# Patient Record
Sex: FEMALE | Race: WHITE
[De-identification: names, ages, dates, MRNs, and addresses within clinical notes are randomized per-mention and may not be internally consistent; named-entity substitution may affect disease eponyms.]

---

## 2017-12-07 NOTE — NUR
RECEIVED REPORT FROM ER NURSE DEVENDRA, ACCOMPANIED BY RN AND EMT VIA KENAN. ATTACHED TO O2 
@ 3LPM VIA NASAL CANNULA, PT IS DROWSY. IV AT LEFT HAND 18G , INFUSING WELL. ON INSULIN 
DRIP. BED IN LOW POSITION, SAFETY MEASURE ENSURE. REDNESS NOTED AT PERINEAL AND GILMA RECTAL 
AREA. WILL CONTINUE TO MONITOR.

## 2017-12-07 NOTE — NUR
APatient will be admitted to care of PHAN. Admited to ICU.  Will go to BED 
5. Belongings list completed. BEDSIDE Report to CLARIZE RN. INSULIN DRIP 
INFUSING AT 5UNITS/HR PER PROTOCOL UPON TRANSFER, PT STABLE TO TRANSFER TO ICU. 
TRASPORTED VIA GURNEY WITH CARDIAC MONITOR AND PULSE OX

## 2017-12-07 NOTE — NUR
PT STILL NOTED WITH KUSSMAULS RESPIRATIONS, ON 4LPMNC SATS 100%, 96HR. ER MD CRAIG MADE AWARE. PT MADE COMFORTBLE, STILL WOULD NOT VERBALLY RESPOND TO 
QUESTIONS. NO S/SX OF PAIN AT THIS TIME. INSULIN DRIP STARTED,SEE MAR

-------------------------------------------------------------------------------

Addendum: 12/07/17 at 2132 by OPHELIA

-------------------------------------------------------------------------------

PER MD CRAIG, ACCUCHECK EVERY HOUR AFTER STARTING INSULIN DRIP

## 2017-12-07 NOTE — NUR
2300 PT HAVING SHORTNESS OF BREATH AND PLACED PT ON BIPAP 12/6 RR 14 30%. LOWERED BIPAP TO 
IPAP 8 EPAP 4 RR 12 DUE TO HIGH TIDAL VOLUMES. PT SEEMS LESS SOB.

## 2017-12-07 NOTE — NUR
DR. LOPEZ AT BEDSIDE WITH DR. OTT. [facility] AT BEDSIDE. PT FOR CENTRAL LINE INSERTION. 
CONSENT SIGNED.

## 2017-12-08 NOTE — NUR
RECEIVED REPORT FROM AM SHIFT. PT IS SLEEPING EASY TO AWAKE. PT IS ALERT ORIENTED X3 WHEN 
AWAKE.ON ROOM AIR,NO S/S OF RESP.DISTRESS,NO SOB. HOB UP 30-45 DEGREE. SPO2 100%. SKIN WATM 
TO TOUCH. DENIES ANY PAIN AT THIS TIME.NELI LUNGS SOUND CLEAR. IV TOLEFT HANDS NO 18GAUGE AND 
RFA NO 22. MAGNESIUM AND POTASSIUM STILL RUNNING AS ORDER. CONT ON INSULIN DRIPS AS PROTOCOL 
AT 2 UNITS/HR. CONT ON IVF D5 IN 1/2 NS  CC/HR AND NS  CC/HR TOLERATING WELL. 
CONT ON IV ABT AS ORDER.NPO EXCEPT MEDS. ABD FLAT,SOFT NON DISTENDED.ACTIVE BOWEL SOUNDS TO 
ALL QUADRANTS.REDNESS TO PERINEAL AND PERIANAL AREA. KEPT AREA CLEAN AND DRY. GOOD PERIANAL 
AND PERINEAL CARE GIVEN. F/C IN PLACE WITH YELLOW CLEAR COLOR NOTED.GENTLE CARE GIVEN. KEPT 
PT CLEAN AND DRY. CALL LIGHT IN REACH. BED IN LOW POSITIONS.

## 2017-12-08 NOTE — NUR
RECEIVED CT HEAD RESULT VIA FAX, NO ACUTE INTRACRANIAL HEMORRHAGE/ABNORMALITY EVIDENT. DR. SWARTZ (RESIDENT) HERE IN THE UNIT  AND AWARE OF THE RESULT.

## 2017-12-08 NOTE — NUR
12/8/17 RD INITIAL ASSESSMENT COMPLETED

PLEASE REFER TO NUTRITION ASSESSMENT UNDER CARE ACTIVITY FOR ESTIMATED NUTRITIONAL NEEDS. 



1. CONTINUE NPO AS MEDICALLY NECESSARY PER MD

2. WHEN MEDICALLY APPROPRIATE ADVANCE DIET TO [facility] 60 GM DIET 

3. RD PROVIDED PT WITH DM DIET EDUCATION

4. RD TO FOLLOW-UP 3-5 DAYS, MODERATE RISK 



DUDLEY SPRINGER RD

## 2017-12-08 NOTE — NUR
BLOOD SUGAR 191 DECREASE INSULIN TO 1 UNIT/HR PER PROTOCOL.PT REQUESTED TO HAVE BED BATH. 
BED BATH WAS GIVEN, PT ABLE TO HELP CLEAN HER SELF. KEPT CLEAN AND DRY. CALL LIGHT IN REACH.

## 2017-12-08 NOTE — NUR
DR. BARRAZA AND RESIDENT GROUP IN TO SEE PT. WILL FOLLOW UP ON ORDERS.

-------------------------------------------------------------------------------

Addendum: 12/08/17 at 1500 by Conner Haines RN

-------------------------------------------------------------------------------

DR. NORMAN

## 2017-12-08 NOTE — NUR
PATIENT HAS BEEN SCREENED AND CATEGORIZED AS HIGH NUTRITION RISK. PATIENT WILL BE SEEN 
WITHIN 1-2 DAYS OF ADMISSION.



12/08/17-12/09/17



DUDLEY SPRINGER RD

## 2017-12-08 NOTE — NUR
PT IS MORE ALERT AND AWAKE AT THIS TIME. DR. LOPEZ AT BEDSIDE WITH THE PATIENT. NO LONGER 
ON BIPAP. O2 @ 2LPM VIA NASAL CANNULA. WILL CONTINUE TO MONITOR.

## 2017-12-08 NOTE — NUR
RECHECKED TEMP. STILL 100.4. COOLING MEASURES IN PLACE. NO S/SX OF ACUTE DISTRESS. WILL 
CONTINUE TO MONITOR.

## 2017-12-08 NOTE — NUR
SPOKE WITH JANET FROM [facility].    SHE SAID THE REVIEW JUST GO TO HER.   FAXED INITIAL REVIEW 
TO [facility] [phone]   PHONE [phone]

## 2017-12-08 NOTE — NUR
PT IS SLEEPING COMFORTABLY. TEMP 100.5. COOLING MEASURES IN PLACE. NO S/SX OF DISTRESS OT 
DISCOMFORT NOTED. WILL CONTINUE TO MONITOR.

## 2017-12-08 NOTE — NUR
DR. GRIGGS MADE AWARE OF PT'S LOW BP. PT IS ASLEEP. NO SIGNS OF ACUTE DISTRESS. WILL 
FOLLOW UP ON ORDERS AND CONTINUE TO MONITOR.

## 2017-12-08 NOTE — NUR
REPORT GIVEN TO NIGHT NURSE FOR CONTINUITY OF CARE. PT IS ASLEEP. NO SOB OR S/SX OF ACUTE 
DISTRESS.

## 2017-12-08 NOTE — NUR
REPORT RECEIVED FROM NIGHT NURSE. PT IS AWAKE AND ALERT, ORIENTED X 4. SINUS TACHY ON 
MONITOR. PT IS ON ROOM AIR, O2 SAT 98%, BILATERAL LUNGS CLEAR. NO SOB NOTED. ABDOMEN SOFT, 
NONDISTENDED, NONTENDER. BOWEL SOUNDS  PRESENT. PERIPHERAL IV G18 TO LEFT WRIST PATENT AND 
INTACT, INFUSING ORDERED IV FLUID AND INSULIN DRIP AT 2 UNIT/HR. CAMPOS CATH IN PLACE 
DRAINING URINE TO GRAVITY DRAINAGE BAG. REDNESS NOTED IN PERINEAL AND GILMA RECTAL AREA. BED 
IN LOWEST POSITION AND CALL LIGHT WITHIN REACH. NEEDS WELL ATTENDED. WILL CONTINUE TO 
MONITOR.

## 2017-12-09 NOTE — NUR
PATIENT RECEIVED FROM ICU. PATIENT AWAKE, ALERT AND ORIENTED. NO S/S OF DISTRESS NOTED. 
PATIENT ON ROOM AIR NO SOB. CAMPOS CATHETER IN PLACE, DRAINING CLEAR YELLOW URINE. PATIENT ON 
TELE MONITORING. BED LOWERED WITH CALL LIGHT WITHIN REACH. WILL CONTINUE TO MONITOR. TEMP 
99.6 BP: 95/55 HR: 108 O2 SAT: 100% ON ROOM AIR

## 2017-12-09 NOTE — NUR
DR PRADO COME TO SEE PT. PLANNING TO TRANSFER PT TO MST FLOOR TODAY. WILL ENDORESED TO AM 
SHIFT TO FOLLOW UP.

## 2017-12-09 NOTE — NUR
PER  TO D/C DEX 5 IN 1/2 NS,AND CONTINUE DRIPS UNTIL 2 MORE HOURS, PT START EATING 
TODAY.HUMALOG TO BE GIVEN AT 7 UNITS AT 7:30 AM.PT MADE AWARE.

## 2017-12-09 NOTE — NUR
REPORT RECEIVED FROM NIGHT NURSE. PT IS AWAKE, AAOX4. ABLE TO MAKE NEEDS KNOWN. ST ON 
MONITOR. ON ROOM AIR, BILATERAL LUNGS CLEAR. PERIPHERAL IVS TO RIGHT FOREARM AND LEFT HAND 
PATENT AND INTACT. ABDOMEN SOFT, NONTENDER, NON DISTENDED. CAMPOS CATH IN PLACE DRAINING 
URINE TO GRAVITY DRAINAGE BAG. SCDS IN PLACE FOR VTE PROPHYLAXIS. HOB 30 DEGREES, BED IN LOW 
POSITION, CALL LIGHT WITHIN REACH. NEEDS WELL ATTENDED. WILL CONTINUE TO MONITOR.

## 2017-12-10 NOTE — NUR
PATIENT AWAKE, ALERT. RESPIRATION EVEN, UNLABOR. NO DISTRESS NOTED AT THIS TIME. DENIED 
PAIN. REQUESTED SHOWER. WILL NOTIFY DR. POSEY. CALL LIGHT WITHIN REACH. WILL CONTINUE TO 
MONITOR

## 2017-12-10 NOTE — NUR
PT HAS BEEN MONITORIN CLOSE ASSISTING ON HER ADLS NOT DISTRESS NOTED ON TELEMETRY SR  PT IS 
AMBULATORY

## 2017-12-10 NOTE — NUR
DISCHARGE INSTRUCTION WAS GIVEN. PATIENT VERBALIZED UNDERSTANDING. IVS WERE REMOVED WITH 
CATHETER TIP INTACT. PATIENT TOLERATED WELL. ID BAND WAS REMOVED. FAMILY AT BEDSIDE. CARDIAC 
MONITOR WAS REMOVED. PATIENT WAS ESCORTED OUT BY FAMILY AND STAFF.

## 2017-12-10 NOTE — NUR
PATIENT IS AWAKE, ALERT. RESPIRATION EVEN, UNLABOR. DENIED PAIN AT THIS TIME. FAMILY AT 
BEDSIDE. CALL LIGHT WITHIN REACH. WILL CONTINUE TO MONITOR

## 2017-12-10 NOTE — NUR
ENDORSEMENT RECEIVED FROM NIGHT SHIFT NURSE. PATIENT'S RESPIRATION EVEN, UNLABOR. PATIENT 
AWAKE, ALERT. SKIN DRY AND WARM. IV INFUSING WELL. CALL LIGHT WITHIN REACH. WILL CONTINUE TO 
MONITOR

## 2018-03-12 NOTE — NUR
PATIENT PRESENTS TO ED WITH LEFT LEG/FOOT SWELLING/PAIN X 1 WEEK, WORSENING 
THIS MORNING. STARTED WITH BLISTER, DENIES INJUTY.

 +DRAINAGE NOTED, TENDER TO TOUCH. PT DIABETIC, DENIES FEVERS/CHILLS. 

MED HX: DM, CHF; RX: LANTUS,HUMALOG; DENIES N/V/D; SKIN IS PINK/WARM/DRY; AAOX4 
WITH EVEN AND STEADY GAIT; LUNGS CLEAR BL; HR EVEN AND REGULAR; PT DENIES ANY 
FEVER, CP, SOB, OR COUGH AT THIS TIME; PATIENT STATES PAIN OF 10/10 AT THIS 
TIME; VSS; PATIENT POSITIONED FOR COMFORT; HOB ELEVATED; BEDRAILS UP X2; BED 
DOWN. ER MD MADE AWARE OF PT STATUS.

## 2018-03-12 NOTE — NUR
Patient will be admitted to care of DR SCHWARTZ. Admited to TELE.  Will go to 
room 105B. Belongings list completed.  Report to FLAQUITA IRENE

## 2018-03-12 NOTE — NUR
RECEIVED PT FROM YVROSE SAMS PT IS AAOX4  AMBULATORY WITH ASSISTANT ON TELEMETRY ST IV ON LEFT 
FA INFUSING WELL BOLUS NS FROM ER, LEFT FOOT EDEMA REDNESS THIRD TOE OPEN WOUND, RT FOOT 
THIRD TOE SCAB PT  TAKEN NARES SWAB MRSA PROTOCOL AND SENT TO LAB  PT IS ORIENTED TO THE 
FLOOR CALL LIGHT WITHIN REACH

## 2018-03-12 NOTE — NUR
PT DOES NOT WANT TO SIGN CONSENT FOR DEBRIDEMENT AND POSSIBLE AMPUTATION ON LEFT FOOT THIRD 
PHALAX SHE VERBALIZED  THAT SHEWANT TO TALK TO THE PODIATRIC DR JOHNS BEFORE SIGN CONSENT.

## 2018-03-13 NOTE — NUR
RECEIVED PT FROM PINO RN PT S/P LEFT FOOT HTIRD TOE AMPUTEE PT AAOX4 LEFT FOOT DRESSING DRY 
TOES PINK COLOR, IV ON RT FA INFUSING WELL ON TELEMETRY ANDRES STARKS DRAINING WELL YELLOW 
URINE  PT MOTHER AT BED SIDE INITIAL ASSESSMENT DONE

## 2018-03-13 NOTE — NUR
PATIENT HAS BEEN SCREENED AND CATEGORIZED AS HIGH NUTRITION RISK. PATIENT WILL BE SEEN 
WITHIN 1-2 DAYS OF ADMISSION.



3/13/18 - 3/14/18



DUDLEY SPRINGER RD

## 2018-03-13 NOTE — NUR
ADMINISTERED SCHEDULED MEDICATIONS, PATIENT REFUSED COLACE 100 MG PO, SHE STATED, "I'M NOT 
CONSTIPATED, I DON'T NEED A STOOL SOFTENER." ALL NEEDS MET AT THIS TIME, BED IN LOW POSITION 
WITH CALL LIGHT WITHIN REACH.

## 2018-03-13 NOTE — NUR
SPOKE WITH DR DWYER OF CRITICAL LAB VALUE FOR BLOOD SUGAR , ORDERED TO GIVE HUMALOG 
10 UNITS SQ NOW.

## 2018-03-13 NOTE — NUR
AFTER PAIN MEDIC GIVEN PT IS SLEEPING WELL BLOODSUGAR TEST  379 AND DR PALOMO SAID TOCOVER  
WITH HUMALOG AS PROTOCOL

## 2018-03-13 NOTE — NUR
BS  AFTER ADMINISTERING HUMALOG 10 UNITS SQ. PATIENT SHOWS NO S/S OF ACUTE DISTRESS, 
NO PAIN, ALL NEEDS MET AT THIS TIME, BED IN LOW POSITION WITH CALL LIGHT WITHIN REACH.

## 2018-03-13 NOTE — NUR
RECEIVED PATIENT REPORT AT BEDSIDE FROM NIGHT NURSE. PATIENT IS SLEEPING, EASILY AROUSABLE 
WITH VOICE AND DENIES PAIN AT THIS TIME. PATIENT ON TELE MONITOR. NOTED EDEMATOUS, PINK, 
WARM TO TOUCH LEFT FOOT WITH PURULENT DRAINAGE ON THE 3RD PHALANX MINERVA, RIGHT FOOT 3RD 
PHALANX SCAB WITH NO S/S OF INFECTION NOTED. PATIENT IS ON ROOM AIR, IV ON THE RIGHT FOREARM 
20G WITH IVF'S INFUSING WELL. PATIENT WAS EXPLAINED POC FOR TODAY, SAFETY PRECAUTIONS IN 
PLACE AND SHE VERBALIZED UNDERSTANDING OF CARE. BED IN LOW POSITION WITH CALL LIGHT WITHIN 
REACH.

## 2018-03-13 NOTE — NUR
ADMINISTERED MORPHINE 1 MG IVP FOR 10/10 LEFT FOOT PAIN, PATIENT ALSO GIVEN SCHEDULED 
MEDICATIONS, DR. PALOMO CAME IN TO SEE PATIENT. PATIENT HAS NOTABLE SHAKES HOWEVER NO FEVER 
INDICATED. DR IS TO PLACE NEW ORDERS.

## 2018-03-14 NOTE — NUR
RECEIVED FROM AM RN IN BED AWAKE AND ON THE PHONE TALKING WITH FAMILY. NO SOB. DENIES PAIN 
AT THIS TIME. DX. I AND D TO LEFT FOOT INFECTION AND AMPUTATION OF 3RD TOE LEFT FOOT 
03/13/18. ABLE TO VERBALIZE NEEDS WELL IN ENGLISH. CALL LIGHT WITH IN REACH. CARE PLANS FOR 
THE NIGHT DISCUSSED WITH HER. ORIENTED X 4. PT. ABLE TO URINATE ON BEDSIDE COMMODE AFTER 
CAMPOS CATHETER REMOVED. ASSISTED BY CNA FROM BED TO BSC. DRESSING INTACT AND NO BLEEDING 
NOTED.

## 2018-03-14 NOTE — NUR
3/14/2018

RD INITIAL ASSESSMENT COMPLETED



PLEASE REFER TO NUTRITION ASSESSMENT UNDER CARE ACTIVITY FOR ESTIMATED NUTRITIONAL NEEDS. 



CONTINUE 60 GMS [facility], [place] by [facility] DIET



NURSING AND DIETARY STAFF TO ENCOURAGE INCREASED PO INTAKE AS TOLERATED.



PROVIDED DM DIET EDUCATION. FOLLOW UP W/ DIET EDUCATION.



RD TO FOLLOW-UP IN 3-5 DAYS PATIENT IS MODERATE RISK.



DUDLEY SPRINGER RD

## 2018-03-14 NOTE — NUR
PT SLEEPING WELL ON TELEMETRY SR LEFT FOOT ON PILLOW ELEVATION DRESSING DRY AND INTACT, 
CAMPOS CATH DRAINING WELL YELLOW URINE IV ON RT FA INFUSING WELL

## 2018-03-14 NOTE — NUR
SPONGE BATH GIVEN LINEN CHANGED  LEFT FOOT ON PILLOW ELEVATION  ON TELEMETRY ST CAMPOS 
DRAINING WELL YELLOW URINE

## 2018-03-14 NOTE — NUR
PT. HAD URINATED IN BEDPAN X 2 AND HAD BM . PROVIDED WITH MIDNIGHT SNACK AS REQUESTED. ABLE 
TO VERBALIZE NEEDS WELL. CALL LIGHT WITH IN REACH.

## 2018-03-15 NOTE — NUR
REQUESTED FOR MORPHINE RT PAIN FROM LEFT FOOT AMPUTATION / 3RD DIGIT/TOE. MEDICATED AS 
ORDERED. NO FURTHER COMPLAINTS DONE. GOOD AFFECT. CALL LIGHT WITH IN REACH AT ALL TIMES. A/O 
X 4.

## 2018-03-15 NOTE — NUR
PATIENT RESTING IN BED AT THIS TIME. NO SIGNS OF RESPIRATORY DISTRESS OR RESPIRATORY 
DEPRESSION. WILL CONTINUE TO MONITOR PATIENT.

## 2018-03-15 NOTE — NUR
PT. SLEEPING. USES CALL LIGHT FOR HELP. A/O X 4. NO SOB. PT. PROVIDED WITH BEDPAN RT CAN NOT 
STAND ON HER LEFT LEG YET. PER PT. TOO PAINFUL TO STEP ON.

## 2018-03-15 NOTE — NUR
RECEIVED REPORT FROM NIGHTSHIFT NURSE AT BEDSIDE. PATIENT IS ASLEEP BUT AROUSABLE AT THIS 
TIME. PATIENT IS ALERT AND ORIENTED X4. PATIENT SHOWS NO SIGNS OF PAIN AT THIS TIME. PATIENT 
DOES NOT SHOW ANY SIGNS OF RESPIRATORY DISTRESS OR RESPIRATORY DEPRESSION. UPDATED BOARD IN 
PATIENTS ROOM. CALL LIGHT WITHIN REACH OF PATIENT. WILL CONTINUE TO MONITOR PATIENT.

## 2018-03-15 NOTE — NUR
PATIENT RESTING IN BED WITH FAMILY MEMBER AT BEDSIDE. PATIENT IN RIGHT SIDE LYING POSITION. 
NO COMPLAINTS OF PAIN AT THIS TIME. WILL CONTINUE TO MONITOR PATIENT.

## 2018-03-15 NOTE — NUR
STARTED 22G IV IN PATIENT'S RIGHT FOREARM. PATIENT TOLERATED WELL. WILL CONTINUE TO MONITOR 
PATIENT.

## 2018-03-15 NOTE — NUR
PATIENT RESTING AT THIS TIME. PATIENT FAMILY MEMBER AT BEDSIDE. NO COMPLAINTS OF PAIN AND NO 
SIGNS OF RESPIRATORY DISTRESS OR RESPIRATORY DEPRESSION. WILL CONTINUE TO MONITOR PATIENT.

## 2018-03-15 NOTE — NUR
SLEEPING. NO SOB. USES CALL LIGHT FOR HELP. IVF SITE TO RIGHT FOREARM INTACT AND WITH GOOD 
BLOOD RETURN.

## 2018-03-15 NOTE — NUR
GAVE MORPHINE 1 MG/0.5 ML TO PATIENT FOR PREPROCEDURE MANAGEMENT OF PAIN BEFORE A WOUND CARE 
DRESSING ON PATIENTS FOOT.  AT BEDSIDE WAITING. MICHAEL FROM PHARMACY IS AWARE OF THE USE 
OF THE MORPHINE 1 MG/0.5 ML. WILL CONTINUE TO MONITOR PATIENT.

## 2018-03-15 NOTE — NUR
RECEIVED FROM AM RN IN BED AWAKE AN D ALERT. NO COMPLAINT OF ANY PAIN AT THIS TIME. NO SOB. 
CALL LIGHT WITH IN REACH AND CARE PLANS FOR THE NIGHT DISCUSSED WITH PT. A/O X 4. DX. I AND 
D OF RIGHT FOOT CALLUS AND AMPUTATION 3RD DIGIT LEFT FOOT.

## 2018-03-15 NOTE — NUR
SLEEPING WELL. MEDICATED WITH PAIN RELIEVER MORPHINE IVP 1 MG. AS ORDERED FOR PAIN S/P 
AMPUTATION 3RD DIGIT LEFT FOOT AND I AND D CALLUS RIGHT ANKLE. NO BLEEDING TO DRESSING.

## 2018-03-15 NOTE — NUR
SPOKE TO DR. DWYER RT WOUND CARE S/P LEFT 3RD TOE AMPUTATION AND S/P I&D LEFT FOOT , DR. ARBOLEDA HAS NOTE 

ON 3/14/2018 " Dressing changed with 1/4 inch iodoform, adaptic, 4x4 gauze, kerlix, and ace 
bandage. To be changed daily by podiatry with the same. "

PLAN OF CARE DISCUSSED WITH DR. DWYER NO WOUND CARE EVALUATION AT THIS TIME, WILL FOLLOW 
PODIATRIST ORDER.

## 2018-03-16 NOTE — NUR
PT. SEEN WITH BLOOD SUGAR CHECK PER FINGERSTICK DONE. NO COMPLAINTS DONE. ABLE TO VERBALIZE 
SIMPLE NEEDS. A/O X 4. CLEAR SPEECH. NO COMPLAINTS DONE THIS SHIFT. NEEDS ATTENDED TO . IVF 
SITE TO RIGHT FOREARM #22 INTACT AND NO INFILTRATION. NO ADVERSE REACTION TO ZOSYN IVP 
INFUSED.

## 2018-03-16 NOTE — NUR
PT. PLACED ON BEDPAN AS REQUESTED.  NO FARTHER COMPLAINTS DONE. A/O X 4. CLEAR SPEECH. NO 
BLEEDING TO  DRESSING OF LEFT LOWER LEG.

## 2018-03-16 NOTE — NUR
PT AWAKE, WATCHING TV. NO COMPLAINTS MADE. WILL ENDORSE TO NEXT SHIFT NURSE FOR CONTINUITY 
OF CARE.

## 2018-03-16 NOTE — NUR
SLEEPING AT THIS TIME. NO RESTLESSNESS. PT. WAKES UP EASILY WHEN TOUCHED. CALL LIGHT WITH IN 
REACH.

## 2018-03-16 NOTE — NUR
PT IN STABLE CONDITION, NO SIGNS OF DISTRESS NOTED. BED IN LOWEST POSITION, CALL LIGHT 
WITHIN REACH. WILL CONTINUE TO MONITOR.

## 2018-03-16 NOTE — NUR
RECEIVED REPORT FROM DAY SHIFT RN, PT IS ANTONIO/SANTIAGO4, ON ROOM AIR. 22G IV ACCESS TO RIGHT FOREARM 
INFUSING NS@30ML/HR. PT USES BEDSIDE COMODE, S/P I&D AND AMPUTATION 3RD DIGIT LEFT FOOT. 
UPDATED BOARD. DISCUSSED PLAN OF CARE WITH PT, PT VERBALIZED UNDERSTANDING. VITAL SIGNS 
WITHIN NORMAL LIMITS. PT IN STABLE CONDITION, NO SIGNS OF DISTRESS NOTED. BED IN LOWEST 
POSITION, CALL LIGHT WITHIN REACH. WILL CONTINUE TO MONITOR.

## 2018-03-16 NOTE — NUR
ADMINISTERED SCHEDULED MEDICATIONS, PT TOLERATED WELL. ALSO, ADMINISTERED MORPHINE FOR C/O 
7/10 FOOT PAIN, AND HUMALOG 6UNIT COVERAGE FOR BLOOD SUGAR LEVEL 253.

## 2018-03-16 NOTE — NUR
RECEIVED PT AAOX4. NO SOB NOTED. NO C/O PAIN AT THIS TIME. IV TO RT FOREARM PATENT AND 
INTACT. CHEST, DIMINISHED AIR ENTRY TO THE BASES. ABDOMEN SOFT, BOWEL SOUNDS. LT FOOT 
DRESSING DRY AND INTACT, LT FOOT ELEVATED WITH PILLOW. INSTRUCTED PT TO CALL FOR ASSISTANCE, 
CALL LIGHT WITHIN REACH, PT VERBALIZED UNDERSTANDING.

## 2018-03-16 NOTE — NUR
3/16/18 RD FOLLOW UP COMPLETED

PLEASE REFER TO NUTRITION PROGRESS NOTE UNDER CARE ACTIVITY FOR ESTIMATED NUTRITION NEEDS.

RD RECOMMENDATIONS:



1. CONTINUE 60 GMS [facility] DIET



2. PROVIDED DM DIET EDUCATION. 



4. RD WILL F/U 5-7 DAYS; LOW RISK.



YARA LI MS, RDN

## 2018-03-17 NOTE — NUR
PATIENT SITTING IN BED PLAYING MONOPOLY AT BEDSIDE WITH FAMILY MEMBERS. NO DISTRESS NOTED. 
COMPLAINTS OF 6/10 PAIN, NORCO GIVEN AS PER ORDERS. SAFETY MEASURES IN PLACE, CALL LIGHT 
WITHIN REACH. WILL CONTINUE TO MONITOR.

## 2018-03-17 NOTE — NUR
PATIENT LYING IN BED SLEEPING, AROUSABLE BY VOICE. NO DISTRESS NOTED. DENIES ANY PAIN. 
SAFETY MEASURES IN PLACE, CALL LIGHT WITHIN REACH. WILL CONTINUE TO MONITOR.

## 2018-03-17 NOTE — NUR
PATIENT LYING IN BED SLEEPING, AROUSABLE BY VOICE. NO DISTRESS NOTED. CONDITION UNCHANGED. 
SAFETY MEASURES IN PLACE, CALL LIGHT WITHIN REACH. WILL CONTINUE TO MONITOR.

## 2018-03-17 NOTE — NUR
PATIENT LYING IN BED SLEEPING, AROUABLE BY VOICE. NO DISTRESS NOTED. DENIES ANY PAIN AT THIS 
TIME. SCHEDULED MEDICATIONS DUE GIVEN. SAFETY MEASURES IN PLACE, CALL LIGHT WITHIN REACH, 
BEDSIDE COMMODE NEAR BEDSIDE. WILL CONTINUE TO MONITOR.

## 2018-03-17 NOTE — NUR
RECEIVED PATIENT LYING ON BED COMFORTABLE LISTENING MUSIC. EXPLAIN TO PATIENT THE PLAN OF 
CARE AND PATIENT VERBALIZED UNDERSTANDING. PATIENT IN LOW BED POSITION, CALL LIGHT WITHIN 
REACH. WILL CONTINUE TO MONITOR .

## 2018-03-17 NOTE — NUR
PATIENT SITTING IN BED WATCHING TV. NO DISTRESS NOTED. DENIES ANY PAIN. DRESSING DRY AND 
INTACT ON LEFT FOOT. SCHEDULED MEDICATIONS DUE GIVEN. SAFETY MEASURES IN PLACE, CALL LIGHT 
WITHIN REACH. WILL CONTINUE TO MONITOR.

## 2018-03-17 NOTE — NUR
VITAL SIGNS WITHIN NORMAL LIMITS. PT IN STABLE CONDITION, NO SIGNS OF DISTRESS NOTED. BED IN 
LOWEST POSITION, CALL LIGHT WITHIN REACH. WILL CONTINUE TO MONITOR.

## 2018-03-17 NOTE — NUR
PATIENT SITTING IN BED WATCHING TV WITH MOTHER AT BEDSIDE. NO DISTRESS NOTED. DENIES ANY 
PAIN. CONDITION UNCHANGED. SAFETY MEASURES IN PLACE,C ALL LIGHT WITHIN REACH. WILL CONTINUE 
TO MONITOR.

## 2018-03-17 NOTE — NUR
RECEIVED CALL FROM LAB TO REPORT CRITICAL LAB AT THE TIME I WAS ENDORSING PT TO DAY SHIFT 
RN, ENDORSED CRITICAL LAB VALUE OF BLOOD GLUCOSE 409 AS WELL. HAD ALREADY ADMINISTERED 10 
UNITS OF HUMALOG TO PT.

## 2018-03-17 NOTE — NUR
RECEIVED REPORT FROM NIGHT SHIFT NURSE. PATIENT LYING DOWN IN BED SLEEPING, AROUSABLE BY 
VOICE. NO DISTRESS NOTED. DENIES ANY PAIN AT THIS TIME. AAOX4, CALM, COOPERATIVE, SKIN COLOR 
APPROPRIATE TO ETHNICITY, WARM TO TOUCH. DRESSING ON LEFT FOOT S/P I&D AND LEFT THIRD TOE 
AMPUTATION DRY AND INTACT. LUNGS CRACKLES HEARD ON ALL LOBES. ABDOMEN SOFT, NON-DISTENDED. 
IV SITE INTACT, PATENT AND INFUSING IVF AS PER ORDERS. REVIEWED PLAN OF CARE WITH PATIENT. 
PATIENT VERBALIZED UNDERSTANDING. SAFETY MEASURES IN PLACE, CALL LIGHT WITHIN REACH, FALL 
PREVENTIONS IN PLACE. WILL CONTINUE TO MONITOR.

## 2018-03-17 NOTE — NUR
ADMINISTERED 10 UNITS OF SLIDING SCALE INSULIN, HUMALOG, TO PT BECAUSE OF PT HAVING BLOOD 
SUGAR . PT TOLERATED WELL.

## 2018-03-18 NOTE — NUR
RECEIVED PT FROM MARY SAMS PT IS AAOX4 USING BSC S/P LEFT FOOT THIRD TOE AMKPUTEE DRESSING 
DRY AND INTACT  PINK COLOR LEFT FOOT TOES ON PILLOW ELEVATION HL ON RT FA PATENT RELATIVES 
AT BED SIDE INITIAL ASSESSMENT DONE

## 2018-03-18 NOTE — NUR
DR. PIERSON AT BEDSIDE READY TO PERFORM US GUIDED THORACENTESIS. ALL MATERIALS AT BEDSIDE AND 
RADIOLOGIST AT BEDSIDE READY TO PERFORM PROCEDURE. TIME OUT PROCEDURE LIST COMPLETED, 
CONSENTS VERIFIED. THORACENTESIS DONE ON LEFT LUNG AS PER DR. PIERSON RECOMMENDED. PROCEDURE 
COMPLETED ON 0720 WITH 1100 ML PLEURAL FLUID DRAINED FROM LEFT LUNG. PATIENT TOLERATED 
PROCEDURE WELL. RESPIRATIONS EVEN, UNLABORED, ON ROOM AIR. SAFETY MEASURES IN PLACE, CALL 
LIGHT WITHIN REACH. WILL CONTINUE TO MONITOR.

## 2018-03-18 NOTE — NUR
ENDORSEMENT GIVEN TO THE DAY SHIFT NURSE. DAY NURSE MARY NOTIFIED ABOUT THE THORACENTESIS 
TODAY @0700AM. TRAY AND BOTTLE LABELED READY WITH LIDOCAINE 2%. SEEN RADIOLOGIST IN THE 
PATIENT FLASH. PATIENT IS STABLE AT THIS TIME

## 2018-03-18 NOTE — NUR
PATIENT SITTING IN BED WATCHING TV. NO DISTRESS NOTED. DENIES ANY PAIN. CONDITION UNCHANGED. 
SAFETY MEASURES IN PLACE, CALL LIGHT WITHIN REACH. WILL CONTINUE TO MONITOR.

## 2018-03-18 NOTE — NUR
PATIENT RESTING IN BED,ASLEEP BUT EASILY AROUSABLE. BM X1 USING BEDSIDE COMMODE BED IN LOW 
POSITION, CALL LIGHT WITHIN REACH,BED ALARM ON. LEFT FOOT ELEVATED  USING PILLOW FOR 
COMFORT, TOES  SHOWING PINK COLOR. PATIENT NEEDS ATTENDED.

## 2018-03-18 NOTE — NUR
Social Service Note:



Per , he is anticipating for patient to be discharged tomorrow or Tuesday. 



I called and spoke with Huong from [facility] [phone], [phone], she 
stated their sister home health agency might be able to accept patient, however, she needs 
to speak with her  first. I am not able to provide home health options to 
patient because there are not many home health agencies that accept [facility] patients, I 
contacted [facility] because they have accepted [facility] patients previously. I requested 
for Huong to contact case management dept tomorrow and let them know if they can accept 
patient. Inquiry was faxed to [facility].

## 2018-03-18 NOTE — NUR
PATIENT SITTING IN BED READING A BOOK. NO DISTRESS NOTED. DENIES ANY PAIN AT THIS TIME. 
SAFETY MEASURES IN PLACE, CALL LIGHT WITHIN REACH. WILL CONTINUE TO MONITOR.

## 2018-03-18 NOTE — NUR
PATIENT LYING IN BED TALKING WITH MOTHER AT BEDSIDE. NO DISTRESS NOTED. COMPLAINS OF 6/10 
PAIN, NORCO GIVEN. OTHER SCHEDULED MEDICATIONS DUE GIVEN. SAFETY MEASURES IN PLACE, CALL 
LIGHT WITHIN REACH. WILL CONTINUE TO MONITOR.

## 2018-03-18 NOTE — NUR
PATIENT SEEN RESTING ASLEEP ON BED EASILY AROUSABLE. BED IN LOW POSITION, CALL LIGHT WITHIN 
REACH. BED ALARM ON. WILL CONTINUE TO MONITOR.

## 2018-03-18 NOTE — NUR
RECEIVED REPORT FROM NIGHT SHIFT NURSE. PATIENT LYING DOWN IN BED SLEEPING, AROUSABLE BY 
VOICE. NO DISTRESS NOTED. DENIES ANY PAIN AT THIS TIME. AAOX4, CALM, COOPERATIVE, SKIN COLOR 
APPROPRIATE TO ETHNICITY, WARM TO TOUCH. DRESSING ON LEFT FOOT S/P I&D AND LEFT THIRD TOE 
AMPUTATION DRY AND INTACT. LUNGS CRACKLES HEARD ON LOWER LOBES. ABDOMEN SOFT, NON-DISTENDED. 
IV SITE INTACT, PATENT, AND ON SALINE LOCK. ALL US GUIDED THORACENTESIS MATERIALS ALREADY 
SET UP AT BEDSIDE. DR. PIERSON TO COME IN SOON TO PERFORM THORACENTESIS AT BEDSIDE. RADIOLOGIST 
ALREADY AT BEDSIDE AWAITING FOR DR. PIERSON TO ARRIVE. REVIEWED PLAN OF CARE WITH PATIENT. 
PATIENT VERBALIZED UNDERSTANDING. SAFETY MEASURES IN PLACE, CALL LIGHT WITHIN REACH, FALL 
PREVENTIONS IN PLACE. WILL CONTINUE TO MONITOR.

## 2018-03-18 NOTE — NUR
PATIENT SITTING IN BED LISTENING TO MUSIC ON PHONE. NO DISTRESS NOTED. DENIES ANY PAIN. 
CONDITION UNCHANGED. SAFETY MEASURES IN PLACE, CALL LIGHT WITHIN REACH. WILL CONTINUE TO 
MONITOR.

## 2018-03-18 NOTE — NUR
PATIENT SITTING IN BED WATCHING TV. LUNCH TRAY IN FRONT. NO DISTRESS NOTED. DENIES ANY PAIN 
AT THIS TIME. SCHEDULED MEDICATIONS DUE GIVEN. SAFETY MEASURES IN PLACE, CALL LIGHT WITHIN 
REACH. WILL CONTINUE TO MONITOR.

## 2018-03-19 NOTE — NUR
PT VOIDING WELL DENIES ANY PAIN OR DISCOMFORT, BLOOD SUGAR 250 COVERAGE WITH 9 UNITS SUBQ 
HUMALOG  FOLLOWING PROTOCOL

## 2018-03-19 NOTE — NUR
ULTRASOUND DONE AT THE BEDSIDE BY DR. CLARK RADIOLOGIST, STATED THERE IS NOT MUCH FLUID TO 
DRAIN. THORACENTESIS CANCELLED BY DR. CLARK. SALVADOR NURSE ASSIGNED AND DR. DWYER NOTIFIED.

## 2018-03-19 NOTE — NUR
RECEIVED REPORT FROM NIGHT SHIFT RN. PATIENT IS AAOX4, NO SIGNS AND SYMPTOMS OF ACUTE 
DISTRESS NOTED AT THIS TIME. PATIENT HAS IV TO RIGHT FA 22, ON HEP LOCK AT THIS TIME. 
PATIENT HAS DRESSING ON LEFT FOOT FOR STATUS POST DEBRIDEMENT ON BOTTOM OF FOOT AND THIRD 
TOE AMPUTATION. BEDSIDE COMMODE IS PRESENT. DISCUSSED PLAN OF CARE WITH PATIENT AND SHE 
VERBALIZED UNDERSTAND. BED IN LOWEST POSITION, SIDE RAILS UP X2, CALL LIGHT PLACED WITHIN 
REACH. WILL CONTINUE TO MONITOR.

## 2018-03-19 NOTE — NUR
FNS CONSULT RECEIVED 3/18/18 FOR MALNUTRITION. 



PLEASE REFER TO RD ASSESSMENTS (INITIAL AND FOLLOW UP) UNDER CARE ACTIVITY FOR ESTIMATED 
NUTRIENT NEEDS AND RECOMMENDATIONS. 



PATIENT WITH GOOD APPETITE, NO NAUSEA/VOMITING/DIARRHEA. PATIENT IS CONSUMING % FOR 
PAST 12 MEALS WITH AN AVERAGE INTAKE OF 98%. THIS IS PROVIDING 1886 KCAL AND 78 GM PROTEIN 
TO MEET 100% OF PATIENT ESTIMATED KCAL AND PROTEIN NEEDS. 



RD PROVIDED PATIENT WITH DM DIET EDUCATION. PLEASE REFER TO FNS TEACHING RECORD.



PATIENT IS NOT AT HIGH RISK FOR MALNUTRITION. 



DUDLEY [address]

## 2018-03-19 NOTE — NUR
RECEIVED REPORT FROM DAY SHIFT NURSE. AAOX4. NO C/O PAIN. NO RESP DISTRESS NOTED. DRESSING 
TO LEFT FOOT CLEAN, DRY AND INTACT. IV TO RIGHT HAND #22G, PATENT AND INTACT. DISCUSSED 
PPLAN OF CARE, PT VERBLAIZED UNDERSTANDING. SAFETY PRECAUTION IN PLACE. CALL LIGHT WITHIN 
REACH. WILL CONTINUE TO MONITOR.

## 2018-03-19 NOTE — NUR
CM NOTE

PATIENT INFORMATION FAXED TO [facility]. [facility]WW / FAX# [phone], ATTN: JORGE 
[phone]

## 2018-03-20 NOTE — NUR
PT IN BED, AWAKE. NO C/O PAIN. PT KEPT COMFORTABLE. SAFETY PRECAUTION IN PLACE. CALL LIGHT 
WITHIN REACH.

## 2018-03-20 NOTE — NUR
RECEIVED REPORT FROM NIGHT SHIFT RN. PATIENT IS AAOX4, NO SIGNS AND SYMPTOMS OF ACUTE 
DISTRESS NOTED AT THIS TIME. PATIENT HAS IV TO RIGHT HAND 22G, ON HEP LOCK AT THIS TIME. 
PATIENT HAS DRESSING ON LEFT FOOT FOR STATUS POST DEBRIDEMENT ON BOTTOM OF FOOT AND THIRD 
TOE AMPUTATION. BEDSIDE COMMODE IS PRESENT. DISCUSSED PLAN OF CARE WITH PATIENT AND SHE 
VERBALIZED UNDERSTAND. BED IN LOWEST POSITION, SIDE RAILS UP X2, CALL LIGHT PLACED WITHIN 
REACH. WILL CONTINUE TO MONITOR.

## 2018-03-20 NOTE — NUR
ORDER FOR HOME HEALTH FOR DRESSING CHANGES.

CALLED [facility], PER CHANTELL UNABLE TO TAKE PATIENT.

CALLED [facility], THEY DO NOT TAKE MEDICAL

CALLED [facility], THEY DO NOT TAKE MEDICAL.

CALLED [facility] AND SPOKE WITH SHEYLA   PHONE [phone]   THEY DO TAKE MEDICAL   
FAXED INQUIRY TO HER WITH THE ORDER [phone].

## 2018-03-20 NOTE — NUR
RECEIVED A CALL FROM GUILLERMO FROM [facility], [phone].   SHE SAID THAT THEY HAD ALREADY 
ACCEPTED THE PATIENT YESTERDAY.   THEY HAVE THE ORDER FOR THE WOUND CARE AND THEY WILL START 
WHEN THE PATIENT IS DISCHARGED.  

I CALLED [facility] AND CANCELLED THEM FOR THIS PATIENT.

## 2018-04-05 NOTE — NUR
RECEIVED PT FROM DAY SHIFT NURSE SEEMA-RN. AOX4, ON ROOM AIR, WITH LEFT HAND #22G SALINE 
LOCK. PT A HARD STICK. PT RESTING IN BED. INTRODUCED MYSELF, UPDATED WHITE BOARD AND 
DISCUSSED PLAN OF CARE WITH PT. PT VERBALIZED UNDERSTANDING. S/P LEFT FOOT, MIDDLE TOE 
AMPUTATION AND SOLE OF FOOT SURGICAL INCISION ON 3/2018. AMBULATORY WITH ASSIST. EDUCATED PT 
ON HOW TO USE CALL LIGHT AND TO USE IT BEFORE GETTING OUT OF BED. [facility]. LBM 4/5 
@0400. NO S/S OF RESPIRATORY DISTRESS AT THIS TIME. BED IN LOWEST POSITION. CALL LIGHT 
WITHIN REACH. WILL CONTINUE TO MONITOR.

## 2018-04-05 NOTE — NUR
PT RESTING IN BED. REQUESTED EAR PHONES HOWEVER WE DO NOT CARRY THEM AS TOLD BY MONITOR DEBORA MCCARTY. PT STATED SHE IS BEGINNING TO HAVE DULL PAIN 3/10 AND WOULD WANT TO HAVE HER NEXT 
DOSE OF TYLENOL WHEN AVAILABLE. WILL CONTINUE TO MONITOR.

## 2018-04-05 NOTE — NUR
PT IS ASLEEP ON THE BED, WOKE HER UP AND GAVE MEDICATIONS. STARTED ON LEVAQUIN IV PIGGYBACK 
AT 100ML/HR. PT TOLERATED. ALL NEEDS ARE MET AT THIS TIME. CALL LIGHT WITHIN REACH. WILL 
CONTINUE TO MONITOR.

## 2018-04-05 NOTE — NUR
PT IS LYING ON THE BED, VERBALIZED PAIN AT A RATE OF 8/10. MEDICATED WITH MORPHINE. MADE 
COMFORTABLE ON THE BED. CALL LIGHT WITHIN REACH. NO OTHER UNTOWARD SIGNS NOTED AT THIS TIME. 
WILL CONTINUE TO MONITOR.

## 2018-04-05 NOTE — NUR
PT IS COMFORTABLY LYING ON THE BED. NO UNTOWARD SIGNS NOTED AT THIS TIME. CALL LIGHT WITHIN 
REACH. WILL CONTINUE TO MONITOR.

## 2018-04-05 NOTE — NUR
PER DOMINICK CORONADO BLOOD CULTURES NOT NEEDED

-------------------------------------------------------------------------------

Addendum: 04/05/18 at 0642 by NEVILLE

-------------------------------------------------------------------------------

DR. PAGNA

## 2018-04-05 NOTE — NUR
PT IS AWAKE LYING ON THE BED, BLOOD GLUCOSE CHECKED DONE RESULT REVEALED 182, GAVE 2UNITS OF 
HUMALOG IN UPPER ARM, SUBQ. OTHER MEDICATIONS GIVEN ALSO. NO UNTOWARD SIGNS NOTED AT THIS 
TIME. CALL LIGHT WITHIN REACH. WILL CONTINUE TO MONITOR.

## 2018-04-05 NOTE — NUR
SCHEDULED MEDICATIONS GIVEN. SPOKE WITH DR. ODEN ABOUT LANTUS 40 UNITS IF IT WAS OKAY 
TO ADMINISTER DUE TO BLOOD GLUCOSE LEVELS BEING 130. SHE SAID IT WAS SAFE TO ADMINISTER 
SINCE IT IS A LONG ACTING INSULIN. INSULIN ADMINISTERED. PT REQUESTED SNACK BEFORE BED. 
SNACK GIVEN ALONG WITH A CHICKEN SANDWICH SINCE SHE SAID THAT WOULDN'T BE ENOUGH TO KEEP HER 
BLOOD SUGAR WITHIN NORMAL RANGE. WILL CONTINUE TO MONITOR.

## 2018-04-05 NOTE — NUR
RECEIVED REPORT FROM FLAQUITA HERNANDEZ FROM ER, PT IS AAOX4, PT HAS IV ON HER LEFT FA, PATENT, 
INTACT, FLUSHING WELL, PT IS S/P LEFT FOOT, MIDDLE TOE AMPUTATION ON 03/2018, FOOT IS 
CURRENTLY WRAPPED WITH DRESSING, NO S/S OF RESPIRATORY DISTRESS NOTED, PT COMPLAINING OF 
8/10 ABDOMINAL PAIN, ORIENTED PT TO ROOM, DISCUSSED PLAN OF CARE WITH PT, PT VERBALIZED 
UNDERSTANDING, SAFETY/FALL PRECAUTIONS ARE IN PLACE, CALL LIGHT IS WITHIN REACH, WILL 
CONTINUE TO MONITOR.

## 2018-04-05 NOTE — NUR
PATIENT PRESENTS TO ED WITH abdominal pain and nausea x1 hour. PT denies 
vomiting/diarrhea; SKIN IS PINK/WARM/DRY; AAOX4 WITH EVEN AND STEADY GAIT; 
LUNGS CLEAR BL; HR EVEN AND REGULAR; PT DENIES ANY FEVER, CP, SOB, OR COUGH AT 
THIS TIME; PATIENT STATES PAIN OF 8/10 AT THIS TIME; VSS; PATIENT POSITIONED 
FOR COMFORT; HOB ELEVATED; BEDRAILS UP X1; BED DOWN. ER MD MADE AWARE OF PT 
STATUS.

## 2018-04-05 NOTE — NUR
PT IS AWAKE LYING ON THE BED, VITAL SIGNS TAKEN. CALL LIGHT WITHIN REACH. NO ABNORMALITIES 
NOTED AT THIS TIME. WILL CONTINUE TO MONITOR.

## 2018-04-05 NOTE — NUR
PATIENT HAS BEEN SCREENED AND CATEGORIZED AS MODERATE NUTRITION RISK. PATIENT WILL BE SEEN 
WITHIN 3-5 DAYS OF ADMISSION.



4/7/18 - 4/9/18



BLANCA ROSENBERG RD

## 2018-04-06 NOTE — NUR
BLOOD GLUCOSE 188. 2 UNITS OF HUMALOG GIVEN AS PER SLIDING SCALE. PT TOLERATED WELL. SNACKS 
PROVIDED AS REQUESTED. WILL CONTINUE TO MONITOR.

## 2018-04-06 NOTE — NUR
PATIENT RETURNED FROM CT STATING SHE WASNT FEELING WELL AND HER VISION WAS BLURRY. CHECKED 
PATIENTS BLOOD SUGAR . PATIENT STATED HER BLURRY VISION RESOLVED AFTER RESTING. WILL 
CONT TO MONITOR.

## 2018-04-06 NOTE — NUR
INITIAL ASSESSMENT PERFORMED. PATIENT ALERT AND ABLE TO VERBALIZE NEEDS. NO ACUTE DISTRESS. 
RESP EVEN AND UNLABORED. PATIENT LUNG SOUNDS CLEAR. BOWEL SOUNDS ACTIVE. PATIENT WITH IV TO 
LEFT HAND 24G WITH KVO. PATIENT WITH LLE WITH BANDAGE ON FOOT. S/P AMPUTATION. DRESSING 
INTACT AND INPLACE. NO C/O PAIN OR DISCOMFORT AT THIS TIME. PLAN OF CARE DISCUSSED WITH 
PATIENT. CONT ON ATB AS ORDERED.BOARD UPDATED. PATIENT AMBULATING TO RESTROOM VOIDING 
FREELY. CALL LIGHT WITHIN REACH. WILL CONT TO MONITOR.

## 2018-04-06 NOTE — NUR
PATIENT ALERT AND ABLE TO VERBALIZE NEEDS NO ACUTE DISTRESS AT THIS TIME. DENIES PAIN. 
PATIENT AWAITING CT  FOR ABD/PELVIS. WILL CONT TO MONITOR.

## 2018-04-06 NOTE — NUR
PATIENT PICKED UP FROM UNIT FOR CT PATIENT WITH PATENT IV TO RAC20G. NO ACUTE DISTRESS . 
DENIES PAIN. CONSENT SIGNED.

## 2018-04-06 NOTE — NUR
DISCHARGE INSTRUCTIONS GIVEN TO PATIENT . PATIENT VERBALIZED UNDERSTANDING AND AGREEMENT. RX 
SCRIPTS GIVEN TO PATIENT. ALL BELONGINGS IN POSSESSION. WOUND TREATMENT DONE BEFORE 
DISCHARGE. PICTURES TAKEN. IVS DISCONTINUED TO LEFT HAND AND RAC. TOLERATED WELL. LUMEN 
INTACT. PATIENT ID BANDS REMOVED. PNA VACCINE ADMINISTERED. PATIENT TO NOTIFY WHEN PATEINT 
IS READY TO BE TAKEN TO PRIVATE VEHICLE WAITING IN FRONT LOBBY.

## 2018-04-06 NOTE — NUR
PATIENT AWARE OF DISCHARGE ORDERS. PATIENT TO CONTACT FAMILY FOR TRANSPORTATION ARRANGEMENTS 
ONCE FAMILY MEMEBER OUT OF WORK. WILL FOLLOW UP.

## 2018-04-06 NOTE — NUR
RECEIVED REPORT FROM NIGHT SHIFT NURSE AT BEDSIDE FOR CONTINUITY OF CARE . PATIENT STABLE. 
PATIENT RESTING IN BED WITH EYES CLOSED. RESP EVEN AND UNLABORED. IV SITE TO LEFT HAND 24G 
TKO. VISIBLE DRESSING TO LEFT FOOT INTACT AND INPLACE.

## 2018-04-06 NOTE — NUR
PT REQUESTED PAIN MEDICATION. PAIN 3/10. TYLENOL GIVEN AND TOLERATED WELL. WILL CONTINUE TO 
MONITOR.

## 2018-04-06 NOTE — NUR
PT C/O BACK PAIN AND ABDOMINAL PAIN 8/10. THERE ARE NO PAIN MEDS AVAILABLE TO GIVE AT THIS 
TIME. TYLENOL WAS ALREADY GIVEN AND IS NOT TIME YET FOR ANOTHER DOSE. CALLED DR. ODEN 
AND SHE SAID SHE WOULD PUT IN AN ORDER FOR MORPHINE SULFATE SINGLE DOSE AS WELL AS COME SEE 
THE PT AFTERWARDS.

## 2018-04-10 NOTE — NUR
PT BACK FROM CT SCAN. STARTED IV LINE TO LEFT HAND 22G. MORPHINE ADMINSITERED 
AS ORDERED. VSS AND NO ACUTE DISTRESS NOTED. WILL CONTINUE TO MONITOR.

## 2018-04-10 NOTE — NUR
Patient discharged with v/s stable. Written and verbal after care instructions 
given and explained. 

Patient alert, oriented and verbalized understanding of instructions. 
Ambulatory with steady gait. All questions addressed prior to discharge. ID 
band removed. Patient advised to follow up with PMD. Rx of NORCO 5/325 MG 
given. Patient educated on indication of medication including possible reaction 
and side effects. Opportunity to ask questions provided and answered.

## 2018-04-10 NOTE — NUR
PT CAME IN C/O FLANK BACK PAIN RADIATING TO LEFT SIDE X 1 DAY. PT STATED THAT 
SHE HAS HX OF RETENTION, CHF, UTI. PAIN 5/10. VSS. RR EVEN AND UNLABORED. 
PITTING +1 EDEMA NOTED TO THE LOWER EXTREMITIES.  DR. RIVAS AT BED SIDE 
EVALUATING PT AND MADE AWARE OF PT'S CONDITION.

## 2019-12-28 ENCOUNTER — HOSPITAL ENCOUNTER (INPATIENT)
Dept: HOSPITAL 26 - MED | Age: 34
LOS: 3 days | Discharge: HOME | DRG: 469 | End: 2019-12-31
Payer: MEDICAID

## 2019-12-28 VITALS — BODY MASS INDEX: 24.4 KG/M2 | HEIGHT: 68 IN | WEIGHT: 161 LBS

## 2019-12-28 VITALS — SYSTOLIC BLOOD PRESSURE: 97 MMHG | DIASTOLIC BLOOD PRESSURE: 62 MMHG

## 2019-12-28 VITALS — SYSTOLIC BLOOD PRESSURE: 89 MMHG | DIASTOLIC BLOOD PRESSURE: 59 MMHG

## 2019-12-28 VITALS — DIASTOLIC BLOOD PRESSURE: 57 MMHG | SYSTOLIC BLOOD PRESSURE: 95 MMHG

## 2019-12-28 VITALS — SYSTOLIC BLOOD PRESSURE: 104 MMHG | DIASTOLIC BLOOD PRESSURE: 62 MMHG

## 2019-12-28 VITALS — DIASTOLIC BLOOD PRESSURE: 61 MMHG | SYSTOLIC BLOOD PRESSURE: 103 MMHG

## 2019-12-28 DIAGNOSIS — E11.10: ICD-10-CM

## 2019-12-28 DIAGNOSIS — Z89.422: ICD-10-CM

## 2019-12-28 DIAGNOSIS — N18.3: ICD-10-CM

## 2019-12-28 DIAGNOSIS — D47.3: ICD-10-CM

## 2019-12-28 DIAGNOSIS — I42.9: ICD-10-CM

## 2019-12-28 DIAGNOSIS — N17.0: Primary | ICD-10-CM

## 2019-12-28 DIAGNOSIS — E87.1: ICD-10-CM

## 2019-12-28 DIAGNOSIS — E43: ICD-10-CM

## 2019-12-28 DIAGNOSIS — E86.0: ICD-10-CM

## 2019-12-28 DIAGNOSIS — E11.65: ICD-10-CM

## 2019-12-28 DIAGNOSIS — D64.9: ICD-10-CM

## 2019-12-28 DIAGNOSIS — N39.0: ICD-10-CM

## 2019-12-28 DIAGNOSIS — I50.43: ICD-10-CM

## 2019-12-28 DIAGNOSIS — E11.21: ICD-10-CM

## 2019-12-28 DIAGNOSIS — I50.9: ICD-10-CM

## 2019-12-28 DIAGNOSIS — E11.51: ICD-10-CM

## 2019-12-28 DIAGNOSIS — Z88.0: ICD-10-CM

## 2019-12-28 LAB
ALBUMIN FLD-MCNC: 2.4 G/DL (ref 3.4–5)
AMYLASE SERPL-CCNC: 39 U/L (ref 25–115)
ANION GAP SERPL CALCULATED.3IONS-SCNC: 27.3 MMOL/L (ref 8–16)
ANION GAP SERPL CALCULATED.3IONS-SCNC: 30 MMOL/L (ref 8–16)
APPEARANCE UR: CLEAR
AST SERPL-CCNC: 16 U/L (ref 15–37)
BASOPHILS # BLD AUTO: 0 K/UL (ref 0–0.22)
BASOPHILS # BLD AUTO: 0 K/UL (ref 0–0.22)
BASOPHILS NFR BLD AUTO: 0.3 % (ref 0–2)
BASOPHILS NFR BLD AUTO: 0.3 % (ref 0–2)
BILIRUB SERPL-MCNC: 0.4 MG/DL (ref 0–1)
BILIRUB UR QL STRIP: (no result)
BUN SERPL-MCNC: 20 MG/DL (ref 7–18)
BUN SERPL-MCNC: 24 MG/DL (ref 7–18)
CHLORIDE SERPL-SCNC: 101 MMOL/L (ref 98–107)
CHLORIDE SERPL-SCNC: 91 MMOL/L (ref 98–107)
CO2 SERPL-SCNC: 11 MMOL/L (ref 21–32)
CO2 SERPL-SCNC: 11.4 MMOL/L (ref 21–32)
COLOR UR: YELLOW
CREAT SERPL-MCNC: 1.3 MG/DL (ref 0.6–1.3)
CREAT SERPL-MCNC: 1.5 MG/DL (ref 0.6–1.3)
EOSINOPHIL # BLD AUTO: 0.1 K/UL (ref 0–0.4)
EOSINOPHIL # BLD AUTO: 0.2 K/UL (ref 0–0.4)
EOSINOPHIL NFR BLD AUTO: 0.5 % (ref 0–4)
EOSINOPHIL NFR BLD AUTO: 1.1 % (ref 0–4)
ERYTHROCYTE [DISTWIDTH] IN BLOOD BY AUTOMATED COUNT: 13.5 % (ref 11.6–13.7)
ERYTHROCYTE [DISTWIDTH] IN BLOOD BY AUTOMATED COUNT: 13.8 % (ref 11.6–13.7)
GFR SERPL CREATININE-BSD FRML MDRD: 51 ML/MIN (ref 90–?)
GFR SERPL CREATININE-BSD FRML MDRD: 60 ML/MIN (ref 90–?)
GLUCOSE SERPL-MCNC: 369 MG/DL (ref 74–106)
GLUCOSE SERPL-MCNC: 570 MG/DL (ref 74–106)
GLUCOSE UR STRIP-MCNC: (no result) MG/DL
HCT VFR BLD AUTO: 26.1 % (ref 36–48)
HCT VFR BLD AUTO: 32.5 % (ref 36–48)
HGB BLD-MCNC: 10.6 G/DL (ref 12–16)
HGB BLD-MCNC: 8.6 G/DL (ref 12–16)
HGB UR QL STRIP: (no result)
KETONES TITR SERPL: NEGATIVE {TITER}
LEUKOCYTE ESTERASE UR QL STRIP: (no result)
LIPASE SERPL-CCNC: 147 U/L (ref 73–393)
LYMPHOCYTES # BLD AUTO: 1.5 K/UL (ref 2.5–16.5)
LYMPHOCYTES # BLD AUTO: 1.7 K/UL (ref 2.5–16.5)
LYMPHOCYTES NFR BLD AUTO: 10.6 % (ref 20.5–51.1)
LYMPHOCYTES NFR BLD AUTO: 11.6 % (ref 20.5–51.1)
MAGNESIUM SERPL-MCNC: 1.6 MG/DL (ref 1.8–2.4)
MAGNESIUM SERPL-MCNC: 2.1 MG/DL (ref 1.8–2.4)
MCH RBC QN AUTO: 28 PG (ref 27–31)
MCH RBC QN AUTO: 28 PG (ref 27–31)
MCHC RBC AUTO-ENTMCNC: 33 G/DL (ref 33–37)
MCHC RBC AUTO-ENTMCNC: 33 G/DL (ref 33–37)
MCV RBC AUTO: 84.5 FL (ref 80–94)
MCV RBC AUTO: 86.9 FL (ref 80–94)
MONOCYTES # BLD AUTO: 0.4 K/UL (ref 0.8–1)
MONOCYTES # BLD AUTO: 0.7 K/UL (ref 0.8–1)
MONOCYTES NFR BLD AUTO: 2.7 % (ref 1.7–9.3)
MONOCYTES NFR BLD AUTO: 4.4 % (ref 1.7–9.3)
NEUTROPHILS # BLD AUTO: 12.2 K/UL (ref 1.8–7.7)
NEUTROPHILS # BLD AUTO: 12.2 K/UL (ref 1.8–7.7)
NEUTROPHILS NFR BLD AUTO: 82.6 % (ref 42.2–75.2)
NEUTROPHILS NFR BLD AUTO: 85.9 % (ref 42.2–75.2)
NITRITE UR QL STRIP: NEGATIVE
PH UR STRIP: 5.5 [PH] (ref 5–9)
PHOSPHATE SERPL-MCNC: 3.2 MG/DL (ref 2.5–4.9)
PHOSPHATE SERPL-MCNC: 3.8 MG/DL (ref 2.5–4.9)
PLATELET # BLD AUTO: 407 K/UL (ref 140–450)
PLATELET # BLD AUTO: 557 K/UL (ref 140–450)
POTASSIUM SERPL-SCNC: 3.7 MMOL/L (ref 3.5–5.1)
POTASSIUM SERPL-SCNC: 4 MMOL/L (ref 3.5–5.1)
PROTHROMBIN TIME: 8.9 SECS (ref 10.8–13.4)
RBC # BLD AUTO: 3.09 MIL/UL (ref 4.2–5.4)
RBC # BLD AUTO: 3.74 MIL/UL (ref 4.2–5.4)
RBC #/AREA URNS HPF: (no result) /HPF (ref 0–5)
SODIUM SERPL-SCNC: 128 MMOL/L (ref 136–145)
SODIUM SERPL-SCNC: 136 MMOL/L (ref 136–145)
T4 FREE SERPL-MCNC: 0.97 NG/DL (ref 0.76–1.46)
TSH SERPL DL<=0.05 MIU/L-ACNC: 3.24 UIU/ML (ref 0.34–3.74)
WBC # BLD AUTO: 14.2 K/UL (ref 4.8–10.8)
WBC # BLD AUTO: 14.8 K/UL (ref 4.8–10.8)
WBC,URINE: (no result) /HPF (ref 0–5)

## 2019-12-28 PROCEDURE — P9016 RBC LEUKOCYTES REDUCED: HCPCS

## 2019-12-28 PROCEDURE — B548ZZA ULTRASONOGRAPHY OF SUPERIOR VENA CAVA, GUIDANCE: ICD-10-PCS | Performed by: GENERAL PRACTICE

## 2019-12-28 PROCEDURE — 02HV33Z INSERTION OF INFUSION DEVICE INTO SUPERIOR VENA CAVA, PERCUTANEOUS APPROACH: ICD-10-PCS | Performed by: GENERAL PRACTICE

## 2019-12-28 RX ADMIN — Medication SCH DEV: at 22:15

## 2019-12-28 RX ADMIN — DEXTROSE SCH MLS/HR: 5 SOLUTION INTRAVENOUS at 19:36

## 2019-12-28 RX ADMIN — Medication SCH DEV: at 21:15

## 2019-12-28 RX ADMIN — DOCUSATE SODIUM SCH MG: 100 CAPSULE, LIQUID FILLED ORAL at 21:00

## 2019-12-28 RX ADMIN — Medication SCH DEV: at 19:15

## 2019-12-28 RX ADMIN — DEXTROSE SCH MLS/HR: 5 SOLUTION INTRAVENOUS at 23:18

## 2019-12-28 RX ADMIN — Medication SCH DEV: at 19:25

## 2019-12-28 RX ADMIN — Medication SCH DEV: at 23:15

## 2019-12-28 RX ADMIN — Medication SCH DEV: at 20:09

## 2019-12-28 RX ADMIN — Medication SCH DEV: at 21:00

## 2019-12-28 NOTE — NUR
RECEIVED PT FROM ER VIA [facility].PT ABLE TO MOVE FROM GURNEY TO BED.MONITORS ATTACHED.ST ON 
MONITOR.ON ROOM AIR.NO SOB NOTED.WITH PERIPHERAL IV TO RT F/A G20 INTACT.ABDOMEN SOFT NON 
TENDER.MAINTAINED ON NPO EXCEPT MEDS.SKIN INTACT.LT 2ND TOE AMPUTEE.NO C/O PAIN MADE.WILL 
CONTINUE TO CLOSELY MONITOR PT

## 2019-12-28 NOTE — NUR
Patient will be admitted to care of DR. YODRE. Admited to ICU.  Will go to room 
ICU-2. Belongings list completed.  Report to LAURA SAMS.

## 2019-12-28 NOTE — NUR
PHONE CALL FROM PHARMACIST; PT ALLERGIC TO PENICILLIN AND ORDERED ROCEPHIN BY DR ABAD; 
PHONE CALL MADE TO DR ABAD AND MADE AWARE THAT PT IS ALLERGIC TO PENICILLIN; DR ABAD SAID OK TO ADMINISTER ROCEPHIN.PHARMACIST AWARE

## 2019-12-28 NOTE — NUR
34/F BIBA FROM HOME C/O HYPERGLYCEMIA 520 AT HOME. FSBS FROM EMS  MG/DL. 
DUE TO INSURANCE PT HAS BEEN OFF LANTUS FOR 1 WK, ONLY USING HUMALOG THAT IS 
"OLD". DENIES PAIN. C/O NAUSEA, WAS GIVEN 4 MG ZOFRAN ODT IN THE FIELD. GCS 15. 


APPEARS PALE, STATES FEELS WEAK. BP 97/62



HX- DM2

-------------------------------------------------------------------------------

Addendum: 12/28/19 at 1716 by MNBAMBIN

-------------------------------------------------------------------------------

ATTEMPTING TO CONTROL BS WITH DIET 

-------------------------------------------------------------------------------

Addendum: 12/28/19 at 1716 by MNMARIA DMSN

-------------------------------------------------------------------------------

LIPS AND MUCUS MEMBRANES DRY

## 2019-12-28 NOTE — NUR
PHONE CALL TO DR HATCH; MADE MIKA ; INSULIN DRIP ALREADY DECREASED TO 0.05/KG/HR 
; PHYSICIAN SAID HE WILL PUT IN ORDERS.

## 2019-12-28 NOTE — NUR
phlebotomist at bedside

-------------------------------------------------------------------------------

Addendum: 12/28/19 at 1641 by PALOMO

-------------------------------------------------------------------------------

INFLUENZA SWAB GIVEN TO PHLEBOTOMIST

## 2019-12-28 NOTE — NUR
DR HATCH AT BEDSIDE AND SPOKE WITH PT , PTS PARTNER AND PTS MOTHER; ANA MCKEON 
REGARDING CENTRAL LINE PLACEMENT; PT AGREED AND ASKED IF ANA CAN SIGN THE CONSENT.CONSENT 
SIGNED BY PTS MOTHER ANA

## 2019-12-29 VITALS — SYSTOLIC BLOOD PRESSURE: 105 MMHG | DIASTOLIC BLOOD PRESSURE: 56 MMHG

## 2019-12-29 VITALS — SYSTOLIC BLOOD PRESSURE: 90 MMHG | DIASTOLIC BLOOD PRESSURE: 55 MMHG

## 2019-12-29 VITALS — DIASTOLIC BLOOD PRESSURE: 69 MMHG | SYSTOLIC BLOOD PRESSURE: 129 MMHG

## 2019-12-29 VITALS — SYSTOLIC BLOOD PRESSURE: 103 MMHG | DIASTOLIC BLOOD PRESSURE: 67 MMHG

## 2019-12-29 VITALS — DIASTOLIC BLOOD PRESSURE: 50 MMHG | SYSTOLIC BLOOD PRESSURE: 88 MMHG

## 2019-12-29 VITALS — DIASTOLIC BLOOD PRESSURE: 81 MMHG | SYSTOLIC BLOOD PRESSURE: 143 MMHG

## 2019-12-29 VITALS — DIASTOLIC BLOOD PRESSURE: 72 MMHG | SYSTOLIC BLOOD PRESSURE: 125 MMHG

## 2019-12-29 VITALS — SYSTOLIC BLOOD PRESSURE: 137 MMHG | DIASTOLIC BLOOD PRESSURE: 79 MMHG

## 2019-12-29 VITALS — DIASTOLIC BLOOD PRESSURE: 56 MMHG | SYSTOLIC BLOOD PRESSURE: 89 MMHG

## 2019-12-29 VITALS — SYSTOLIC BLOOD PRESSURE: 99 MMHG | DIASTOLIC BLOOD PRESSURE: 51 MMHG

## 2019-12-29 VITALS — DIASTOLIC BLOOD PRESSURE: 68 MMHG | SYSTOLIC BLOOD PRESSURE: 101 MMHG

## 2019-12-29 VITALS — DIASTOLIC BLOOD PRESSURE: 78 MMHG | SYSTOLIC BLOOD PRESSURE: 135 MMHG

## 2019-12-29 LAB
ANION GAP SERPL CALCULATED.3IONS-SCNC: 12.7 MMOL/L (ref 8–16)
ANION GAP SERPL CALCULATED.3IONS-SCNC: 14.7 MMOL/L (ref 8–16)
ANION GAP SERPL CALCULATED.3IONS-SCNC: 16 MMOL/L (ref 8–16)
BARBITURATES UR QL SCN: NEGATIVE NG/ML
BASOPHILS # BLD AUTO: 0 K/UL (ref 0–0.22)
BASOPHILS NFR BLD AUTO: 0.4 % (ref 0–2)
BENZODIAZ UR QL SCN: NEGATIVE NG/ML
BUN SERPL-MCNC: 14 MG/DL (ref 7–18)
BUN SERPL-MCNC: 18 MG/DL (ref 7–18)
BUN SERPL-MCNC: 18 MG/DL (ref 7–18)
BZE UR QL SCN: NEGATIVE NG/ML
CANNABINOIDS UR QL SCN: NEGATIVE NG/ML
CHLORIDE SERPL-SCNC: 107 MMOL/L (ref 98–107)
CHLORIDE SERPL-SCNC: 109 MMOL/L (ref 98–107)
CHLORIDE SERPL-SCNC: 109 MMOL/L (ref 98–107)
CHOLEST/HDLC SERPL: 6.6 {RATIO} (ref 1–4.5)
CO2 SERPL-SCNC: 18 MMOL/L (ref 21–32)
CO2 SERPL-SCNC: 20 MMOL/L (ref 21–32)
CO2 SERPL-SCNC: 22.8 MMOL/L (ref 21–32)
CREAT SERPL-MCNC: 1 MG/DL (ref 0.6–1.3)
CREAT SERPL-MCNC: 1.2 MG/DL (ref 0.6–1.3)
CREAT SERPL-MCNC: 1.2 MG/DL (ref 0.6–1.3)
EOSINOPHIL # BLD AUTO: 0.2 K/UL (ref 0–0.4)
EOSINOPHIL NFR BLD AUTO: 2 % (ref 0–4)
ERYTHROCYTE [DISTWIDTH] IN BLOOD BY AUTOMATED COUNT: 13.4 % (ref 11.6–13.7)
GFR SERPL CREATININE-BSD FRML MDRD: 66 ML/MIN (ref 90–?)
GFR SERPL CREATININE-BSD FRML MDRD: 66 ML/MIN (ref 90–?)
GFR SERPL CREATININE-BSD FRML MDRD: 81 ML/MIN (ref 90–?)
GLUCOSE SERPL-MCNC: 108 MG/DL (ref 74–106)
GLUCOSE SERPL-MCNC: 186 MG/DL (ref 74–106)
GLUCOSE SERPL-MCNC: 212 MG/DL (ref 74–106)
HCT VFR BLD AUTO: 24.4 % (ref 36–48)
HDLC SERPL-MCNC: 16 MG/DL (ref 40–60)
HGB BLD-MCNC: 8.3 G/DL (ref 12–16)
LDLC SERPL CALC-MCNC: 55 MG/DL (ref 60–100)
LYMPHOCYTES # BLD AUTO: 1.1 K/UL (ref 2.5–16.5)
LYMPHOCYTES NFR BLD AUTO: 9.5 % (ref 20.5–51.1)
MAGNESIUM SERPL-MCNC: 1.7 MG/DL (ref 1.8–2.4)
MAGNESIUM SERPL-MCNC: 2.2 MG/DL (ref 1.8–2.4)
MAGNESIUM SERPL-MCNC: 2.2 MG/DL (ref 1.8–2.4)
MCH RBC QN AUTO: 29 PG (ref 27–31)
MCHC RBC AUTO-ENTMCNC: 34 G/DL (ref 33–37)
MCV RBC AUTO: 84.5 FL (ref 80–94)
MONOCYTES # BLD AUTO: 0.5 K/UL (ref 0.8–1)
MONOCYTES NFR BLD AUTO: 4.2 % (ref 1.7–9.3)
NEUTROPHILS # BLD AUTO: 9.3 K/UL (ref 1.8–7.7)
NEUTROPHILS NFR BLD AUTO: 83.9 % (ref 42.2–75.2)
OPIATES UR QL SCN: NEGATIVE NG/ML
PCP UR QL SCN: NEGATIVE NG/ML
PHOSPHATE SERPL-MCNC: 2.2 MG/DL (ref 2.5–4.9)
PHOSPHATE SERPL-MCNC: 2.3 MG/DL (ref 2.5–4.9)
PHOSPHATE SERPL-MCNC: 2.6 MG/DL (ref 2.5–4.9)
PLATELET # BLD AUTO: 394 K/UL (ref 140–450)
POTASSIUM SERPL-SCNC: 3.5 MMOL/L (ref 3.5–5.1)
POTASSIUM SERPL-SCNC: 3.7 MMOL/L (ref 3.5–5.1)
POTASSIUM SERPL-SCNC: 3.7 MMOL/L (ref 3.5–5.1)
POTASSIUM SERPL-SCNC: 4 MMOL/L (ref 3.5–5.1)
RBC # BLD AUTO: 2.89 MIL/UL (ref 4.2–5.4)
SODIUM SERPL-SCNC: 138 MMOL/L (ref 136–145)
SODIUM SERPL-SCNC: 139 MMOL/L (ref 136–145)
SODIUM SERPL-SCNC: 141 MMOL/L (ref 136–145)
TRIGL SERPL-MCNC: 175 MG/DL (ref 30–150)
WBC # BLD AUTO: 11 K/UL (ref 4.8–10.8)

## 2019-12-29 RX ADMIN — Medication SCH DEV: at 05:07

## 2019-12-29 RX ADMIN — DEXTROSE SCH MLS/HR: 5 SOLUTION INTRAVENOUS at 04:54

## 2019-12-29 RX ADMIN — Medication SCH DEV: at 04:05

## 2019-12-29 RX ADMIN — DOCUSATE SODIUM SCH MG: 100 CAPSULE, LIQUID FILLED ORAL at 08:13

## 2019-12-29 RX ADMIN — DEXTROSE SCH MLS/HR: 5 SOLUTION INTRAVENOUS at 00:07

## 2019-12-29 RX ADMIN — INSULIN GLARGINE SCH UNITS: 100 INJECTION, SOLUTION SUBCUTANEOUS at 10:35

## 2019-12-29 RX ADMIN — Medication SCH DEV: at 01:12

## 2019-12-29 RX ADMIN — Medication SCH DEV: at 11:49

## 2019-12-29 RX ADMIN — SODIUM CHLORIDE SCH MLS/HR: 9 INJECTION, SOLUTION INTRAVENOUS at 15:01

## 2019-12-29 RX ADMIN — DOCUSATE SODIUM SCH MG: 100 CAPSULE, LIQUID FILLED ORAL at 21:20

## 2019-12-29 RX ADMIN — Medication SCH DEV: at 02:12

## 2019-12-29 RX ADMIN — Medication SCH DEV: at 20:00

## 2019-12-29 RX ADMIN — INSULIN LISPRO PRN UNITS: 100 INJECTION, SOLUTION INTRAVENOUS; SUBCUTANEOUS at 17:01

## 2019-12-29 RX ADMIN — Medication SCH DEV: at 17:50

## 2019-12-29 RX ADMIN — INSULIN LISPRO PRN UNITS: 100 INJECTION, SOLUTION INTRAVENOUS; SUBCUTANEOUS at 05:33

## 2019-12-29 RX ADMIN — INSULIN LISPRO PRN UNITS: 100 INJECTION, SOLUTION INTRAVENOUS; SUBCUTANEOUS at 07:46

## 2019-12-29 RX ADMIN — Medication SCH DEV: at 07:46

## 2019-12-29 RX ADMIN — DEXTROSE SCH MLS/HR: 5 SOLUTION INTRAVENOUS at 04:30

## 2019-12-29 RX ADMIN — Medication SCH DEV: at 17:22

## 2019-12-29 RX ADMIN — INSULIN LISPRO PRN UNITS: 100 INJECTION, SOLUTION INTRAVENOUS; SUBCUTANEOUS at 11:48

## 2019-12-29 RX ADMIN — DEXTROSE SCH MLS/HR: 5 SOLUTION INTRAVENOUS at 04:21

## 2019-12-29 RX ADMIN — Medication SCH DEV: at 00:09

## 2019-12-29 RX ADMIN — INSULIN LISPRO PRN UNITS: 100 INJECTION, SOLUTION INTRAVENOUS; SUBCUTANEOUS at 21:14

## 2019-12-29 RX ADMIN — FAMOTIDINE SCH MG: 10 INJECTION INTRAVENOUS at 12:44

## 2019-12-29 RX ADMIN — Medication SCH DEV: at 03:05

## 2019-12-29 RX ADMIN — SODIUM CHLORIDE SCH MLS/HR: 9 INJECTION, SOLUTION INTRAVENOUS at 05:32

## 2019-12-29 NOTE — NUR
PT NOTED WITH ELEVATED , C/O HEART BURN POINTING CHEST, BACK AND NAUSEA. DENIES 
DIFFICULTY BREATHING OR SOB. SPO2 1005 IN ROOM AIR. DR. CHANCE MADE AWARE. ZOFRAN 
ADMINISTERED PER ORDER. EKG DONE SHOWED ST. LAB MADE AWARE OF TROPONIN TO BE DRAWN.

## 2019-12-29 NOTE — NUR
PT IN BED RESTING BUT AROUSABLE TO NAME AND LIGHT TOUCH. IV SITE ON R IJ INTACT AND RUNNING 
NORMAL SALINE AT 120MLS/HR. ROCEPHIN HUNG AND RUNNING AT 100MLS/HR. FINGERSTICK , PT 
GIVEN 4 UNITS OF HUMALOG COVERAGE PROVIDED SQ IN ABDOMEN. V/S AS FOLLOWS: T 98.7 P 102 R 20 
B/P 120/72 02 98% ON ROOM AIR . ALL UNIVERSAL FALLS PRECAUTIONS IN PLACE .

## 2019-12-29 NOTE — NUR
PT SITTING UP IN BED AOX4, NO C/O OF PAIN OR DISTRESS NOTED.RIJ TRIPLE LUMEN INTACT AND 
RUNNING NORMAL SALINE AT 120MLS/HR. V/S AS FOLLOWS: T 98.3 P 107 R 18 B/P 103/67 98% ON ROOM 
AIR. FINGERSTICK .WILL COVER WITH HUMALOG ALL UNIVERSAL FALLS PRECAUTIONS IN PLACE AND 
CALL SUAREZ IN REACH.

## 2019-12-29 NOTE — NUR
PT IN BED, DRINKING WATER DESPITE REMINDER TO NOT DO SO. PT'S BOYFRIEND STILL AT BEDSIDE. PT 
REMAINS NON-COMPLIANT WITH THE DIET ORDERED BY ATTENDING DUE TO DX: DKA.

## 2019-12-29 NOTE — NUR
PT VOIDED 350ML YELLOW URINE/BEDPAN; PT THEN VERBALIZED "FEELING BLOOD SUGAR IS LOW". BLOOD 
SUGAR CHECKED 78; NOTIFIED DR BASSETT, INSULIN DRIP ON HOLD AT THIS TIME.

## 2019-12-29 NOTE — NUR
RECEIVED PATIENT FROM ICU NURSE LILIAN. PATIENT IS FULL CODE, ALLERGIES TO PENICILLIN. 
PATIENT IS AAOX4, AMBULATORY, ROOM AIR. PT USES BEDSIDE COMMODE. PATIENT HAS A RIGHT IJ 
TRIPLE LUMEN WITH NS INFUSING AT 120ML/HR. IV TO RIGHT FA 20G SALINE LOCK. WILL REVIEW PLAN 
OF CARE AND CONTINUE FOR THE DAY.

## 2019-12-29 NOTE — NUR
ADMINISTERED 10U INSULIN FOR BLOOD SUGAR 387. PATIENT IS RESTING QUIETLY IN BED, NO RESP 
DISTRESS NOTED.

## 2019-12-29 NOTE — NUR
RECEIVED REPORT FROM NIGHT SHIFT RN. PT RESTING IN BED AWAKE. A/O X4. MAKES NEEDS KNOWN. IN 
ROOM AIR. SPO2 100%. SKIN DRY AND WARM TO TOUCH. LUNGS CLEAR. RIJ TRIPLE LUMEN CATH IN 
PLACE. DRESSING INTACT. GOOD BLOOD RETURNS. FLUSHED. NS INFUSING  ML/HR. ABDOMEN SOFT, 
ROUND AND NON-TENDER. ACTIVE BOWEL SOUND. DENIES NAUSEA, VOMITING OR ABDOMINAL PAIN AT THIS 
TIME. SKIN INTACT. AMPUTEE LEFT 2ND TOE. HOB ELEVATED. BED IN LOW POSITION LOCKED. WILL 
CONTINUE TO MONITOR.

## 2019-12-29 NOTE — NUR
ST ON MONITOR. NO SOB OR RESPIRATORY DISTRESS NOTED. SPO2 99% IN ROOM AIR. NO CHANGE IN 
CONDITION. WILL CONTINUE TO MONITOR.

## 2019-12-29 NOTE — NUR
PER DR GIBSON, PATIENT IS TO HAVE BLOOD SUGAR CHECKS Q4H ATC TO AVOID DKA AGAIN. INFORMED 
RESIDENTS AND ORDER HAS BEEN INPUT.

## 2019-12-29 NOTE — NUR
IVF CHANGED TO NS AT 100ML/HR.INSULIN SLIDING SCALE ADMINISTERED FOR ; VERIFIED W/RN 
MALINI.

PTS EYES CLOSED.AROUSABLE TO NAME AND TOUDH STIMULI.TLC TO RT IJ WITH GOOD BLEED RETURN TO 
ALL 3 PORTS.NO SOB ON ROOM AIR.SR ON MONITOR HR 95 AND /66.DENIES PAIN

## 2019-12-29 NOTE — NUR
BS 95;  STILL ON INSULIN DRIP AT 0.05UNITS/KG/HR ;DR BASSETT AWARE.ALSO NOTIFIED 
RE;MAGNESIUM 1.7; NO CHANGE IN ORDER

## 2019-12-29 NOTE — NUR
NOTED PT'S BOYFRIEND AT BEDSIDE TO BE GIVING HER WATER DESPITE INFORMING HIM AND THE PT THAT 
PT IS CURRENTLY NPO EXCEPT MEDS. RISKS AND BENEFITS WERE ALREADY PREVIOUSLY EXPLAINED.

## 2019-12-29 NOTE — NUR
RECEIVED REPORT FROM XIN CULLEN AT BEDSIDE FOR CONTINUITY OF CARE, PT IN STABLE 
CONDITION.

-------------------------------------------------------------------------------

Addendum: 12/29/19 at 1928 by Christy Rodriguez RN

-------------------------------------------------------------------------------

RECEIVED REPORT FROM FÁTIMA LAUREN RN.

## 2019-12-30 VITALS — SYSTOLIC BLOOD PRESSURE: 120 MMHG | DIASTOLIC BLOOD PRESSURE: 81 MMHG

## 2019-12-30 VITALS — DIASTOLIC BLOOD PRESSURE: 84 MMHG | SYSTOLIC BLOOD PRESSURE: 123 MMHG

## 2019-12-30 VITALS — DIASTOLIC BLOOD PRESSURE: 79 MMHG | SYSTOLIC BLOOD PRESSURE: 119 MMHG

## 2019-12-30 VITALS — DIASTOLIC BLOOD PRESSURE: 72 MMHG | SYSTOLIC BLOOD PRESSURE: 120 MMHG

## 2019-12-30 VITALS — DIASTOLIC BLOOD PRESSURE: 67 MMHG | SYSTOLIC BLOOD PRESSURE: 101 MMHG

## 2019-12-30 VITALS — SYSTOLIC BLOOD PRESSURE: 138 MMHG | DIASTOLIC BLOOD PRESSURE: 90 MMHG

## 2019-12-30 LAB
ANION GAP SERPL CALCULATED.3IONS-SCNC: 11.8 MMOL/L (ref 8–16)
BASOPHILS # BLD AUTO: 0 K/UL (ref 0–0.22)
BASOPHILS # BLD AUTO: 0 K/UL (ref 0–0.22)
BASOPHILS NFR BLD AUTO: 0.2 % (ref 0–2)
BASOPHILS NFR BLD AUTO: 0.5 % (ref 0–2)
BUN SERPL-MCNC: 13 MG/DL (ref 7–18)
CHLORIDE SERPL-SCNC: 106 MMOL/L (ref 98–107)
CO2 SERPL-SCNC: 21.9 MMOL/L (ref 21–32)
CREAT SERPL-MCNC: 1.1 MG/DL (ref 0.6–1.3)
EOSINOPHIL # BLD AUTO: 0.1 K/UL (ref 0–0.4)
EOSINOPHIL # BLD AUTO: 0.1 K/UL (ref 0–0.4)
EOSINOPHIL NFR BLD AUTO: 1.9 % (ref 0–4)
EOSINOPHIL NFR BLD AUTO: 2.4 % (ref 0–4)
ERYTHROCYTE [DISTWIDTH] IN BLOOD BY AUTOMATED COUNT: 13.8 % (ref 11.6–13.7)
ERYTHROCYTE [DISTWIDTH] IN BLOOD BY AUTOMATED COUNT: 14.2 % (ref 11.6–13.7)
GFR SERPL CREATININE-BSD FRML MDRD: 73 ML/MIN (ref 90–?)
GLUCOSE SERPL-MCNC: 253 MG/DL (ref 74–106)
HCT VFR BLD AUTO: 20.8 % (ref 36–48)
HCT VFR BLD AUTO: 24.2 % (ref 36–48)
HGB BLD-MCNC: 6.9 G/DL (ref 12–16)
HGB BLD-MCNC: 8.1 G/DL (ref 12–16)
IRON SERPL-MCNC: 16 UG/DL (ref 50–175)
LYMPHOCYTES # BLD AUTO: 1 K/UL (ref 2.5–16.5)
LYMPHOCYTES # BLD AUTO: 1.4 K/UL (ref 2.5–16.5)
LYMPHOCYTES NFR BLD AUTO: 17.5 % (ref 20.5–51.1)
LYMPHOCYTES NFR BLD AUTO: 25.6 % (ref 20.5–51.1)
MAGNESIUM SERPL-MCNC: 1.8 MG/DL (ref 1.8–2.4)
MCH RBC QN AUTO: 28 PG (ref 27–31)
MCH RBC QN AUTO: 29 PG (ref 27–31)
MCHC RBC AUTO-ENTMCNC: 33 G/DL (ref 33–37)
MCHC RBC AUTO-ENTMCNC: 34 G/DL (ref 33–37)
MCV RBC AUTO: 85.5 FL (ref 80–94)
MCV RBC AUTO: 86.5 FL (ref 80–94)
MONOCYTES # BLD AUTO: 0.5 K/UL (ref 0.8–1)
MONOCYTES # BLD AUTO: 0.5 K/UL (ref 0.8–1)
MONOCYTES NFR BLD AUTO: 8 % (ref 1.7–9.3)
MONOCYTES NFR BLD AUTO: 9.6 % (ref 1.7–9.3)
NEUTROPHILS # BLD AUTO: 3.3 K/UL (ref 1.8–7.7)
NEUTROPHILS # BLD AUTO: 4.3 K/UL (ref 1.8–7.7)
NEUTROPHILS NFR BLD AUTO: 61.9 % (ref 42.2–75.2)
NEUTROPHILS NFR BLD AUTO: 72.4 % (ref 42.2–75.2)
PHOSPHATE SERPL-MCNC: 2.9 MG/DL (ref 2.5–4.9)
PLATELET # BLD AUTO: 229 K/UL (ref 140–450)
PLATELET # BLD AUTO: 275 K/UL (ref 140–450)
POTASSIUM SERPL-SCNC: 3.7 MMOL/L (ref 3.5–5.1)
RBC # BLD AUTO: 2.43 MIL/UL (ref 4.2–5.4)
RBC # BLD AUTO: 2.8 MIL/UL (ref 4.2–5.4)
SODIUM SERPL-SCNC: 136 MMOL/L (ref 136–145)
TIBC SERPL-MCNC: 99 UG/DL (ref 250–450)
WBC # BLD AUTO: 5.4 K/UL (ref 4.8–10.8)
WBC # BLD AUTO: 5.9 K/UL (ref 4.8–10.8)

## 2019-12-30 PROCEDURE — 30233N1 TRANSFUSION OF NONAUTOLOGOUS RED BLOOD CELLS INTO PERIPHERAL VEIN, PERCUTANEOUS APPROACH: ICD-10-PCS

## 2019-12-30 RX ADMIN — Medication SCH DEV: at 20:08

## 2019-12-30 RX ADMIN — INSULIN LISPRO PRN UNITS: 100 INJECTION, SOLUTION INTRAVENOUS; SUBCUTANEOUS at 23:32

## 2019-12-30 RX ADMIN — INSULIN LISPRO PRN UNITS: 100 INJECTION, SOLUTION INTRAVENOUS; SUBCUTANEOUS at 08:45

## 2019-12-30 RX ADMIN — SODIUM CHLORIDE SCH MLS/HR: 9 INJECTION, SOLUTION INTRAVENOUS at 15:13

## 2019-12-30 RX ADMIN — ACETAMINOPHEN SCH MG: 325 TABLET ORAL at 16:00

## 2019-12-30 RX ADMIN — SODIUM CHLORIDE SCH MLS/HR: 9 INJECTION, SOLUTION INTRAVENOUS at 00:36

## 2019-12-30 RX ADMIN — ACETAMINOPHEN SCH MG: 325 TABLET ORAL at 11:37

## 2019-12-30 RX ADMIN — FERROUS SULFATE TAB 325 MG (65 MG ELEMENTAL FE) SCH MG: 325 (65 FE) TAB at 17:03

## 2019-12-30 RX ADMIN — FERROUS SULFATE TAB 325 MG (65 MG ELEMENTAL FE) SCH MG: 325 (65 FE) TAB at 11:57

## 2019-12-30 RX ADMIN — INSULIN GLARGINE SCH UNITS: 100 INJECTION, SOLUTION SUBCUTANEOUS at 08:45

## 2019-12-30 RX ADMIN — Medication SCH DEV: at 12:27

## 2019-12-30 RX ADMIN — SODIUM CHLORIDE SCH MLS/HR: 9 INJECTION, SOLUTION INTRAVENOUS at 22:36

## 2019-12-30 RX ADMIN — Medication SCH DEV: at 08:37

## 2019-12-30 RX ADMIN — Medication SCH DEV: at 00:51

## 2019-12-30 RX ADMIN — Medication SCH DEV: at 23:29

## 2019-12-30 RX ADMIN — Medication SCH DEV: at 04:57

## 2019-12-30 RX ADMIN — Medication SCH DEV: at 16:34

## 2019-12-30 RX ADMIN — INSULIN LISPRO PRN UNITS: 100 INJECTION, SOLUTION INTRAVENOUS; SUBCUTANEOUS at 16:00

## 2019-12-30 RX ADMIN — SODIUM CHLORIDE SCH MLS/HR: 9 INJECTION, SOLUTION INTRAVENOUS at 06:53

## 2019-12-30 RX ADMIN — INSULIN LISPRO PRN UNITS: 100 INJECTION, SOLUTION INTRAVENOUS; SUBCUTANEOUS at 05:02

## 2019-12-30 RX ADMIN — INSULIN LISPRO PRN UNITS: 100 INJECTION, SOLUTION INTRAVENOUS; SUBCUTANEOUS at 11:57

## 2019-12-30 RX ADMIN — FAMOTIDINE SCH MG: 10 INJECTION INTRAVENOUS at 11:38

## 2019-12-30 RX ADMIN — INSULIN LISPRO PRN UNITS: 100 INJECTION, SOLUTION INTRAVENOUS; SUBCUTANEOUS at 20:07

## 2019-12-30 RX ADMIN — DOCUSATE SODIUM SCH MG: 100 CAPSULE, LIQUID FILLED ORAL at 20:03

## 2019-12-30 RX ADMIN — INSULIN LISPRO PRN UNITS: 100 INJECTION, SOLUTION INTRAVENOUS; SUBCUTANEOUS at 00:58

## 2019-12-30 RX ADMIN — DOCUSATE SODIUM SCH MG: 100 CAPSULE, LIQUID FILLED ORAL at 08:36

## 2019-12-30 NOTE — NUR
PT IN BED SLEEPING BUT AROUSABLE TO NAME AND LIGHT TOUCH, NO S/S OF PAIN OR DISTRESS NOTED.  
V/S AS FOLLOWS; T 99.1 P 94 R 18 B/P 101/67 02 97% ON ROOM AIR. FINGERSTICK , PT GIVEN 
4 UNITS OF HUMALOG COVERAGE. ALL REQUESTED NEEDS ATTENDED BY STAFF AND ALL UNIVERSAL FALLS 
PRECAUTIONS IN PLACE.

## 2019-12-30 NOTE — NUR
ADMINISTERED MORNING MEDICATION. PATIENT RECEIVED 4U HUMALOG AND 40U LANTUS INSULIN THIS 
MORNING. PATIENT SIGNED CONSENT FOR BLOOD TRANSFUSION, AS WELL AS RESIDENT. RESIDENT AT 
BEDSIDE EXPLAINING TRANSFUSION. NO FURTHER QUESTIONS AT THIS TIME FROM PATIENT.

## 2019-12-30 NOTE — NUR
ADMINISTERED TYLENOL AND BENADRYL FOR BLOOD TRANSFUSION. ADMINISTERED HUMALOG 6U FOR BLOOD 
SUGAR 264. PATIENTS MOTHER IS AT BEDSIDE. NO COMPLAINTS AT THIS TIME.

## 2019-12-30 NOTE — NUR
RECEIVED CRITICAL LAB VALUE OF HGB 6.9, HCT 20.8. INFORMED RESIDENTS, PATIENT TO HAVE BLOOD 
TRANSFUSION. WILL PRINT OUT CONSENT AND HAVE PATIENT SIGN.

## 2019-12-30 NOTE — NUR
12/30/19 RD INITIAL ASSESSMENT COMPLETED



PLEASE REFER TO NUTRITION ASSESSMENT UNDER CARE ACTIVITY FOR ESTIMATED NUTRITIONAL NEEDS. 



[facility]. [facility] AS [facility]ERATED 

2. RD PROVIDED NUTRITION EDUCATION ON DIABETES

3. RD TO FOLLOW-UP 5-7 DAYS, LOW RISK 



BLANCA ROSENBERG RD

## 2019-12-30 NOTE — NUR
PATIENT HAS BEEN SCREENED AND CATEGORIZED AS HIGH NUTRITION RISK. PATIENT WILL BE SEEN 
WITHIN 1-2 DAYS OF ADMISSION.



12/30/19



BLANCA ROSENBERG RD

## 2019-12-30 NOTE — NUR
BEGAN BLOOD TRANSFUSION. DID PRE-CHECK WITH FLAQUITA MANTILLA. PATIENTS PRE TRANSFUSION VITALS ARE 
T 97.1, MO 97, /79, O2 100%. NO COMPLAINTS FROM PATIENT AT THIS TIME. WILL MONITOR 
PATIENT DURING FIRST 15 MIN OF TRANSFUSION

## 2019-12-30 NOTE — NUR
RECEIVED REPORT FROM NIGHT RN. PATIENT IS FULL CODE, ALLERGIES TO PENICILLIN. CAME FROM 
HOME, SKIN INTACT WITH THE EXCEPTION OF L 2ND TOE AMPUTATION. AAOX4, ROOM AIR, CCHO60 DIET. 
PT HAS A RIGHT IJ TRIPLE LUMEN AND A RIGHT FA 20G SALINE LOCKED. PATIENT IS GETTING NS VIA 
TRIPLE LUMEN AT 120ML/HR. BLOOD SUGAR CHECKS ARE TO BE Q4H. WILL REVIEW AND CONTINUE WITH 
PLAN OF CARE FOR THE DAY.

## 2019-12-30 NOTE — NUR
PATIENT HAS COMPLETED TRANSFUSION. NO REACTION NOTED DURING TRANSFUSION. WILL F/U WITH LAB 
TO DRAW CBC.

## 2019-12-30 NOTE — NUR
Note:



Basic Screen: 

Yes

High Risk DC Screen 

No

 Name: 

ANA MCKEON

Home Tel: 

[phone]

Relationship: 

MOTHER

Pre-Admission Living Arrangements: 

Lives with Other

Prior ADL 

 Independent

Current Home Health Name/Tel: 

N/A

Current DME/02 Name/Tel: 

N/A

Current Hospice Name/Tel: 

N/A

Current Dialysis Name/Tel: 

N/A

Healthcare Decision Maker: 

Patient

Advance Directive 

No - REFUSED

Information Taught: 

 Advance Directive

Person Taught: 

Patient

Teaching Tools: 

Verbal

Factors Affecting Learning: 

None

Participation Level: 

Refused

Evaluation: 

Verbalizes Understanding

Needs Additional Education: 

No

Discipline: 

Case Mgt/Social Svcs

Tentative Discharge Plan/Destination: 

No Needs Identified

Will require assistance post discharge: 

No

 Referred to : 

No

Tentative Discharge Plan Summary: 

Patient is a 34-year-old female admitted for DKA. Patient has PMHX of DM 

and CHF. Patient was admitted from home. SW met with patient at bedside to 

verify demographics. Patient reported no history of mental health and no 

history of substance abuse. Patient's tentative discharge plan is to 

return home. No further needs identified.

 Signature: 

AIDA Mayorga

Date: 

Dec 30, 2019

Time: 

11:52

## 2019-12-30 NOTE — NUR
REPORT RECEIVED FROM AM NURSE AT BEDSIDE. PT IN STABLE CONDITION. AAOX4. INTRODUCED SELF TO 
PT. BOARD UPDATED. NO COMPLAINTS OF PAIN. NO SOB. AFEBRILE. PT IS AMBULATORY. IV SITE R FA 
20G SL PATENT AND INTACT. SECOND LINE IS R IJ CENTRAL LINE TRIPLE LUMEN RUNNING NS@100ML/HR 
ALL PORTS EXCEPT BROWN ARE PATENT AND INTACT. SKIN WARM, DRY, AND INTACT WITH NO OPEN 
WOUNDS. BED LOCKED IN LOW POSITION. CALL BELL WITHIN REACH. SAFETY PRECAUTION IN PLACE. ALL 
NEEDS MET AT THIS TIME.

## 2019-12-30 NOTE — NUR
PATIENT IS SITTING UP IN BED, MOTHER AT BEDSIDE. NO COMPLAINTS, BLOOD STILL INFUSING. WILL 
CALL LAB TO F/U WITH BLOOD DRAW AFTER TRANSFUSION.

## 2019-12-30 NOTE — NUR
PT AWAKE AND ALERT AMBULATING AROUND UNIT. PT HAS STEADY GAIT. NO S/S OF DISTRESS NOTED. 
WILL CONTINUE TO MONITOR.

## 2019-12-30 NOTE — NUR
PATIENT IS SITTING UP IN BED EATING DINNER. NO COMPLAINTS AT THIS TIME, ALL NEEDS HAVE BEEN 
MET, WILL ENDORSE TO NEXT SHIFT FOR CONTINUITY OF CARE

## 2019-12-31 VITALS — DIASTOLIC BLOOD PRESSURE: 77 MMHG | SYSTOLIC BLOOD PRESSURE: 113 MMHG

## 2019-12-31 VITALS — SYSTOLIC BLOOD PRESSURE: 144 MMHG | DIASTOLIC BLOOD PRESSURE: 68 MMHG

## 2019-12-31 VITALS — SYSTOLIC BLOOD PRESSURE: 105 MMHG | DIASTOLIC BLOOD PRESSURE: 64 MMHG

## 2019-12-31 VITALS — SYSTOLIC BLOOD PRESSURE: 104 MMHG | DIASTOLIC BLOOD PRESSURE: 68 MMHG

## 2019-12-31 LAB
ANION GAP SERPL CALCULATED.3IONS-SCNC: 12.5 MMOL/L (ref 8–16)
BASOPHILS # BLD AUTO: 0 K/UL (ref 0–0.22)
BASOPHILS NFR BLD AUTO: 0.6 % (ref 0–2)
BUN SERPL-MCNC: 20 MG/DL (ref 7–18)
CHLORIDE SERPL-SCNC: 110 MMOL/L (ref 98–107)
CO2 SERPL-SCNC: 24.1 MMOL/L (ref 21–32)
CREAT SERPL-MCNC: 1.1 MG/DL (ref 0.6–1.3)
EOSINOPHIL # BLD AUTO: 0.2 K/UL (ref 0–0.4)
EOSINOPHIL NFR BLD AUTO: 4.7 % (ref 0–4)
ERYTHROCYTE [DISTWIDTH] IN BLOOD BY AUTOMATED COUNT: 14.4 % (ref 11.6–13.7)
FERRITIN SERPL-MCNC: 231 NG/ML (ref 15–150)
FOLATE SERPL-MCNC: 3.7 NG/ML (ref 3–?)
GFR SERPL CREATININE-BSD FRML MDRD: 73 ML/MIN (ref 90–?)
GLUCOSE SERPL-MCNC: 155 MG/DL (ref 74–106)
HCT VFR BLD AUTO: 25.4 % (ref 36–48)
HGB BLD-MCNC: 8.4 G/DL (ref 12–16)
LYMPHOCYTES # BLD AUTO: 1.7 K/UL (ref 2.5–16.5)
LYMPHOCYTES NFR BLD AUTO: 37 % (ref 20.5–51.1)
MAGNESIUM SERPL-MCNC: 1.8 MG/DL (ref 1.8–2.4)
MCH RBC QN AUTO: 29 PG (ref 27–31)
MCHC RBC AUTO-ENTMCNC: 33 G/DL (ref 33–37)
MCV RBC AUTO: 86.7 FL (ref 80–94)
MONOCYTES # BLD AUTO: 0.5 K/UL (ref 0.8–1)
MONOCYTES NFR BLD AUTO: 10.3 % (ref 1.7–9.3)
NEUTROPHILS # BLD AUTO: 2.2 K/UL (ref 1.8–7.7)
NEUTROPHILS NFR BLD AUTO: 47.4 % (ref 42.2–75.2)
PHOSPHATE SERPL-MCNC: 3.5 MG/DL (ref 2.5–4.9)
PLATELET # BLD AUTO: 284 K/UL (ref 140–450)
POTASSIUM SERPL-SCNC: 3.6 MMOL/L (ref 3.5–5.1)
RBC # BLD AUTO: 2.93 MIL/UL (ref 4.2–5.4)
SODIUM SERPL-SCNC: 143 MMOL/L (ref 136–145)
TRANSFERRIN SERPL-MCNC: 105 MG/DL (ref 200–370)
VIT B12 SERPL-MCNC: 1471 PG/ML (ref 211–946)
WBC # BLD AUTO: 4.7 K/UL (ref 4.8–10.8)

## 2019-12-31 RX ADMIN — INSULIN LISPRO PRN UNITS: 100 INJECTION, SOLUTION INTRAVENOUS; SUBCUTANEOUS at 09:18

## 2019-12-31 RX ADMIN — Medication SCH DEV: at 12:45

## 2019-12-31 RX ADMIN — INSULIN LISPRO PRN UNITS: 100 INJECTION, SOLUTION INTRAVENOUS; SUBCUTANEOUS at 12:45

## 2019-12-31 RX ADMIN — INSULIN GLARGINE SCH UNITS: 100 INJECTION, SOLUTION SUBCUTANEOUS at 09:11

## 2019-12-31 RX ADMIN — Medication SCH DEV: at 08:00

## 2019-12-31 RX ADMIN — SODIUM CHLORIDE SCH MLS/HR: 9 INJECTION, SOLUTION INTRAVENOUS at 07:53

## 2019-12-31 RX ADMIN — FAMOTIDINE SCH MG: 10 INJECTION INTRAVENOUS at 12:40

## 2019-12-31 RX ADMIN — DOCUSATE SODIUM SCH MG: 100 CAPSULE, LIQUID FILLED ORAL at 09:09

## 2019-12-31 RX ADMIN — INSULIN LISPRO PRN UNITS: 100 INJECTION, SOLUTION INTRAVENOUS; SUBCUTANEOUS at 04:18

## 2019-12-31 RX ADMIN — Medication SCH DEV: at 04:06

## 2019-12-31 RX ADMIN — FERROUS SULFATE TAB 325 MG (65 MG ELEMENTAL FE) SCH MG: 325 (65 FE) TAB at 09:09

## 2019-12-31 NOTE — NUR
FINGERSTICK GLUCOSE 240. ADMINISTERED 4UNITS HUMALOG. UPDATED PT ON PLAN FOR DISCHARGE. PT 
IS OK WITH CHANGE IN PLAN OF CARE

## 2019-12-31 NOTE — NUR
VV=079,2UNITS HUMALOG INSULIN SUB.Q GIVEN.TELE SHOWING SR.IVF IS IN PROGRESS.NO C/O PAIN 
AND/OR DISCOMFORT.VS STABLE.CALL LIGHT WITHIN REACH.

## 2019-12-31 NOTE — NUR
RECEIVED REPORT FROM SARAH SAMS.PT IS IN STABLE CONDITION.NO S/S OF ANY DISTRESS NOTED 
NOW.WILL CONTINUE MONITORING.

## 2019-12-31 NOTE — NUR
RECEIVED BEDSIDE REPORT FROM PM RN PT AWAKE IN BED PT APPEARS STABLE AND IN NO APPARENT 
DISTRESS. ALL SAFETY MEASURES ARE IN PLACE WILL CONTINUE TO MONITOR.

## 2019-12-31 NOTE — NUR
FREQUENT ROUNDING ON PT PT IS ON TELE PT APPEARS STABLE AND IN NO APPARENT DISTRESS. ALL 
SAFETY MEASURES ARE IN PLACE WILL CONTINUE TO MONITOR

## 2019-12-31 NOTE — NUR
REMOVED CENTRAL LINE. APPLIED PRESSURE FOR 10 MINUTES. NO MORE BLEEDING AT THE SITE. REMOVED 
ID BAND. REVIEWED DISCHARGE INSTRUCTIONS REVIEWED MEDICATIONS ANSWERED ALL OF PATIENTS 
QUESTIONS. PT FAMILY AT BEDSIDE ANSWERED ALL OF THERE QUESTIONS AS WELL. PT AMBULATED OFF 
THE UNIT WITH ALL PERSONAL BELONGINGS,

## 2021-02-18 ENCOUNTER — HOSPITAL ENCOUNTER (EMERGENCY)
Dept: HOSPITAL 26 - MED | Age: 36
Discharge: HOME | End: 2021-02-18
Payer: COMMERCIAL

## 2021-02-18 VITALS — SYSTOLIC BLOOD PRESSURE: 169 MMHG | DIASTOLIC BLOOD PRESSURE: 92 MMHG

## 2021-02-18 VITALS — SYSTOLIC BLOOD PRESSURE: 136 MMHG | DIASTOLIC BLOOD PRESSURE: 78 MMHG

## 2021-02-18 VITALS — BODY MASS INDEX: 23.23 KG/M2 | HEIGHT: 67 IN | WEIGHT: 148 LBS

## 2021-02-18 DIAGNOSIS — E11.9: ICD-10-CM

## 2021-02-18 DIAGNOSIS — I51.9: ICD-10-CM

## 2021-02-18 DIAGNOSIS — Z88.0: ICD-10-CM

## 2021-02-18 DIAGNOSIS — F15.10: Primary | ICD-10-CM

## 2021-02-18 LAB
ALBUMIN FLD-MCNC: 1.3 G/DL (ref 3.4–5)
ANION GAP SERPL CALCULATED.3IONS-SCNC: 11.3 MMOL/L (ref 8–16)
APPEARANCE UR: (no result)
AST SERPL-CCNC: 24 U/L (ref 15–37)
BASOPHILS # BLD AUTO: 0.1 K/UL (ref 0–0.22)
BASOPHILS NFR BLD AUTO: 2.1 % (ref 0–2)
BILIRUB SERPL-MCNC: 0.1 MG/DL (ref 0–1)
BILIRUB UR QL STRIP: NEGATIVE
BUN SERPL-MCNC: 23 MG/DL (ref 7–18)
CHLORIDE SERPL-SCNC: 111 MMOL/L (ref 98–107)
CO2 SERPL-SCNC: 21.7 MMOL/L (ref 21–32)
COLOR UR: YELLOW
CREAT SERPL-MCNC: 2 MG/DL (ref 0.6–1.3)
EOSINOPHIL # BLD AUTO: 0.6 K/UL (ref 0–0.4)
EOSINOPHIL NFR BLD AUTO: 11 % (ref 0–4)
ERYTHROCYTE [DISTWIDTH] IN BLOOD BY AUTOMATED COUNT: 18.5 % (ref 11.6–13.7)
GFR SERPL CREATININE-BSD FRML MDRD: 36 ML/MIN (ref 90–?)
GLUCOSE SERPL-MCNC: 71 MG/DL (ref 74–106)
GLUCOSE UR STRIP-MCNC: (no result) MG/DL
HCT VFR BLD AUTO: 24.2 % (ref 36–48)
HGB BLD-MCNC: 7.8 G/DL (ref 12–16)
HGB UR QL STRIP: (no result)
LEUKOCYTE ESTERASE UR QL STRIP: NEGATIVE
LIPASE SERPL-CCNC: 50 U/L (ref 73–393)
LYMPHOCYTES # BLD AUTO: 1.5 K/UL (ref 2.5–16.5)
LYMPHOCYTES NFR BLD AUTO: 25.7 % (ref 20.5–51.1)
MCH RBC QN AUTO: 28 PG (ref 27–31)
MCHC RBC AUTO-ENTMCNC: 32 G/DL (ref 33–37)
MCV RBC AUTO: 85.4 FL (ref 80–94)
MONOCYTES # BLD AUTO: 0.4 K/UL (ref 0.8–1)
MONOCYTES NFR BLD AUTO: 6.2 % (ref 1.7–9.3)
NEUTROPHILS # BLD AUTO: 3.2 K/UL (ref 1.8–7.7)
NEUTROPHILS NFR BLD AUTO: 55 % (ref 42.2–75.2)
NITRITE UR QL STRIP: NEGATIVE
PH UR STRIP: 7 [PH] (ref 5–9)
PLATELET # BLD AUTO: 202 K/UL (ref 140–450)
POTASSIUM SERPL-SCNC: 4 MMOL/L (ref 3.5–5.1)
RBC # BLD AUTO: 2.83 MIL/UL (ref 4.2–5.4)
RBC #/AREA URNS HPF: (no result) /HPF (ref 0–5)
SODIUM SERPL-SCNC: 140 MMOL/L (ref 136–145)
WBC # BLD AUTO: 5.8 K/UL (ref 4.8–10.8)
WBC,URINE: (no result) /HPF (ref 0–5)

## 2021-02-18 PROCEDURE — 74176 CT ABD & PELVIS W/O CONTRAST: CPT

## 2021-02-18 PROCEDURE — 85025 COMPLETE CBC W/AUTO DIFF WBC: CPT

## 2021-02-18 PROCEDURE — 96374 THER/PROPH/DIAG INJ IV PUSH: CPT

## 2021-02-18 PROCEDURE — 80053 COMPREHEN METABOLIC PANEL: CPT

## 2021-02-18 PROCEDURE — 83880 ASSAY OF NATRIURETIC PEPTIDE: CPT

## 2021-02-18 PROCEDURE — 36415 COLL VENOUS BLD VENIPUNCTURE: CPT

## 2021-02-18 PROCEDURE — 99285 EMERGENCY DEPT VISIT HI MDM: CPT

## 2021-02-18 PROCEDURE — 83690 ASSAY OF LIPASE: CPT

## 2021-02-18 PROCEDURE — 81025 URINE PREGNANCY TEST: CPT

## 2021-02-18 PROCEDURE — 81001 URINALYSIS AUTO W/SCOPE: CPT

## 2021-02-18 RX ADMIN — KETOROLAC TROMETHAMINE ONE MG: 30 INJECTION, SOLUTION INTRAMUSCULAR; INTRAVENOUS at 17:21

## 2021-02-18 RX ADMIN — FUROSEMIDE ONE MG: 10 INJECTION, SOLUTION INTRAMUSCULAR; INTRAVENOUS at 18:06

## 2021-03-06 ENCOUNTER — HOSPITAL ENCOUNTER (EMERGENCY)
Dept: HOSPITAL 26 - MED | Age: 36
Discharge: HOME | End: 2021-03-06
Payer: COMMERCIAL

## 2021-03-06 VITALS — SYSTOLIC BLOOD PRESSURE: 167 MMHG | DIASTOLIC BLOOD PRESSURE: 94 MMHG

## 2021-03-06 VITALS — BODY MASS INDEX: 23.49 KG/M2 | HEIGHT: 68 IN | WEIGHT: 155 LBS

## 2021-03-06 VITALS — DIASTOLIC BLOOD PRESSURE: 94 MMHG | SYSTOLIC BLOOD PRESSURE: 155 MMHG

## 2021-03-06 DIAGNOSIS — Z88.0: ICD-10-CM

## 2021-03-06 DIAGNOSIS — Z88.8: ICD-10-CM

## 2021-03-06 DIAGNOSIS — I11.9: ICD-10-CM

## 2021-03-06 DIAGNOSIS — M86.171: Primary | ICD-10-CM

## 2021-03-06 DIAGNOSIS — Z45.2: ICD-10-CM

## 2021-03-06 PROCEDURE — 99281 EMR DPT VST MAYX REQ PHY/QHP: CPT

## 2021-03-08 ENCOUNTER — HOSPITAL ENCOUNTER (INPATIENT)
Dept: HOSPITAL 26 - MED | Age: 36
LOS: 9 days | Discharge: NURSING, REHAB OR LONG-TERM CARE | DRG: 349 | End: 2021-03-17
Attending: FAMILY MEDICINE | Admitting: FAMILY MEDICINE
Payer: COMMERCIAL

## 2021-03-08 VITALS — BODY MASS INDEX: 23.34 KG/M2 | WEIGHT: 154 LBS | HEIGHT: 68 IN

## 2021-03-08 VITALS — SYSTOLIC BLOOD PRESSURE: 153 MMHG | DIASTOLIC BLOOD PRESSURE: 98 MMHG

## 2021-03-08 VITALS — DIASTOLIC BLOOD PRESSURE: 89 MMHG | SYSTOLIC BLOOD PRESSURE: 148 MMHG
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
## 2021-03-08 DIAGNOSIS — L03.115: ICD-10-CM

## 2021-03-08 DIAGNOSIS — Z89.422: ICD-10-CM

## 2021-03-08 DIAGNOSIS — L97.519: ICD-10-CM

## 2021-03-08 DIAGNOSIS — I50.9: ICD-10-CM

## 2021-03-08 DIAGNOSIS — Z88.0: ICD-10-CM

## 2021-03-08 DIAGNOSIS — B95.2: ICD-10-CM

## 2021-03-08 DIAGNOSIS — L02.611: ICD-10-CM

## 2021-03-08 DIAGNOSIS — E11.621: ICD-10-CM

## 2021-03-08 DIAGNOSIS — L97.529: ICD-10-CM

## 2021-03-08 DIAGNOSIS — J45.909: ICD-10-CM

## 2021-03-08 DIAGNOSIS — M86.8X7: ICD-10-CM

## 2021-03-08 DIAGNOSIS — E11.22: ICD-10-CM

## 2021-03-08 DIAGNOSIS — T87.81: Primary | ICD-10-CM

## 2021-03-08 DIAGNOSIS — B95.62: ICD-10-CM

## 2021-03-08 DIAGNOSIS — E43: ICD-10-CM

## 2021-03-08 DIAGNOSIS — E11.42: ICD-10-CM

## 2021-03-08 DIAGNOSIS — E11.69: ICD-10-CM

## 2021-03-08 DIAGNOSIS — Z87.891: ICD-10-CM

## 2021-03-08 DIAGNOSIS — E11.51: ICD-10-CM

## 2021-03-08 DIAGNOSIS — Z88.1: ICD-10-CM

## 2021-03-08 DIAGNOSIS — B95.5: ICD-10-CM

## 2021-03-08 DIAGNOSIS — Z90.49: ICD-10-CM

## 2021-03-08 DIAGNOSIS — Z20.822: ICD-10-CM

## 2021-03-08 DIAGNOSIS — Z79.4: ICD-10-CM

## 2021-03-08 DIAGNOSIS — N18.30: ICD-10-CM

## 2021-03-08 LAB
ALBUMIN FLD-MCNC: 1.5 G/DL (ref 3.4–5)
ANION GAP SERPL CALCULATED.3IONS-SCNC: 12.6 MMOL/L (ref 8–16)
APPEARANCE UR: (no result)
AST SERPL-CCNC: 25 U/L (ref 15–37)
BASOPHILS # BLD AUTO: 0.1 K/UL (ref 0–0.22)
BASOPHILS NFR BLD AUTO: 1.3 % (ref 0–2)
BILIRUB SERPL-MCNC: 0.1 MG/DL (ref 0–1)
BILIRUB UR QL STRIP: NEGATIVE
BUN SERPL-MCNC: 19 MG/DL (ref 7–18)
CHLORIDE SERPL-SCNC: 108 MMOL/L (ref 98–107)
CO2 SERPL-SCNC: 22.9 MMOL/L (ref 21–32)
COLOR UR: YELLOW
CREAT SERPL-MCNC: 2.2 MG/DL (ref 0.6–1.3)
EOSINOPHIL # BLD AUTO: 0.5 K/UL (ref 0–0.4)
EOSINOPHIL NFR BLD AUTO: 8.8 % (ref 0–4)
ERYTHROCYTE [DISTWIDTH] IN BLOOD BY AUTOMATED COUNT: 16 % (ref 11.6–13.7)
GFR SERPL CREATININE-BSD FRML MDRD: 32 ML/MIN (ref 90–?)
GLUCOSE SERPL-MCNC: 229 MG/DL (ref 74–106)
GLUCOSE UR STRIP-MCNC: (no result) MG/DL
HCT VFR BLD AUTO: 29.8 % (ref 36–48)
HGB BLD-MCNC: 9.9 G/DL (ref 12–16)
HGB UR QL STRIP: (no result)
LEUKOCYTE ESTERASE UR QL STRIP: NEGATIVE
LYMPHOCYTES # BLD AUTO: 1.5 K/UL (ref 2.5–16.5)
LYMPHOCYTES NFR BLD AUTO: 28.7 % (ref 20.5–51.1)
MCH RBC QN AUTO: 28 PG (ref 27–31)
MCHC RBC AUTO-ENTMCNC: 33 G/DL (ref 33–37)
MCV RBC AUTO: 85.9 FL (ref 80–94)
MONOCYTES # BLD AUTO: 0.2 K/UL (ref 0.8–1)
MONOCYTES NFR BLD AUTO: 4.5 % (ref 1.7–9.3)
NEUTROPHILS # BLD AUTO: 3 K/UL (ref 1.8–7.7)
NEUTROPHILS NFR BLD AUTO: 56.7 % (ref 42.2–75.2)
NITRITE UR QL STRIP: NEGATIVE
PH UR STRIP: 7 [PH] (ref 5–9)
PLATELET # BLD AUTO: 246 K/UL (ref 140–450)
POTASSIUM SERPL-SCNC: 3.5 MMOL/L (ref 3.5–5.1)
RBC # BLD AUTO: 3.47 MIL/UL (ref 4.2–5.4)
RBC #/AREA URNS HPF: (no result) /HPF (ref 0–5)
SODIUM SERPL-SCNC: 140 MMOL/L (ref 136–145)
WBC # BLD AUTO: 5.3 K/UL (ref 4.8–10.8)
WBC,URINE: (no result) /HPF (ref 0–5)

## 2021-03-08 PROCEDURE — 02HV33Z INSERTION OF INFUSION DEVICE INTO SUPERIOR VENA CAVA, PERCUTANEOUS APPROACH: ICD-10-PCS | Performed by: FAMILY MEDICINE

## 2021-03-08 PROCEDURE — B548ZZA ULTRASONOGRAPHY OF SUPERIOR VENA CAVA, GUIDANCE: ICD-10-PCS | Performed by: FAMILY MEDICINE

## 2021-03-08 RX ADMIN — INSULIN GLARGINE SCH UNITS: 100 INJECTION, SOLUTION SUBCUTANEOUS at 23:17

## 2021-03-08 RX ADMIN — SODIUM CHLORIDE SCH MLS/HR: 9 INJECTION, SOLUTION INTRAVENOUS at 20:10

## 2021-03-08 NOTE — NUR
RECEIVED REPORT FROM ER NURSE. PATIENT IS ADMITTED FOR OSTEOMYELITIS AND PICC LINE 
PLACEMENT. PATIENT IS AWAKE, ALERT, AND COOPERATIVE. RESPIRATION EVEN UNLABORED ON ROOM AIR. 
NO DISTRESS NOTED. SKIN IS WARM AND DRY. NO IV SITE NOTED. HEART RATE REGULAR. S1&S2 NOTED. 
LUNGS SOUNDS CLEAR UPON AUSCULTATION. BOWEL SOUNDS PRESENT IN ALL QUADRANTS. ABDOMEN SOFT 
AND NON-TENDER. LAST BM 3/7/21. OPEN WOUND NOTED ON BILATERAL FOOT. TOE AMPUTATION NOTED ON 
THE LEFT FOOT 2ND TOE DIGIT AND RIGHT FOOT MIDDLE TOE DIGIT. MRSA SCREEN DONE. VITALS WERE 
TAKEN. ORIENT PATIENT TO ROOM, STAFF, AND CALL LIGHT. ALL SAFETY MEASURES IN PLACE. BED IS 
AT LOW POSITION. CALL LIGHT WITHIN REACH. WILL CONTINUE TO MONITOR.

## 2021-03-08 NOTE — NUR
PER PATIENT SHE HAS A HISTORY OF DM AND TAKES 20 UNITS OF LANTUS AT BED TIME. CHECK PATIENT 
BLOOD SUGAR. PATIENT BLOOD SUGAR. 256 WILL NOTIFY MD AND ASK FOR ORDERS.

## 2021-03-08 NOTE — NUR
UNABLE TO ADMINISTER PATIENT IV ABX DUE TO PATIENT HAVING NO IV SITE ACCESS. PER ER NURSE MD 
IS AWARE AND THERE'S AN ORDER FOR PICC LINE PLACEMENT. AWAITING FOR PICC LINE NURSE.

## 2021-03-08 NOTE — NUR
36 Y/O FEMALE PRESENTED TO ED FOR SCHEDULED PICC LINE PLACEMENT. AO4, BREATHING 
EVEN AND UNLABORED, SKIN WARM AND DRY. BED IN LOWEST POSITION, LOCKED, X1 
SIDERAIL UP.



PMH - DM, CHF, COPD



ALLERGY - PCN, PIPERACILLIN, TAZOBACTAM

## 2021-03-08 NOTE — NUR
DR. ARREOLA OKAY WITH WITHOLDING IV MEDS UNTIL PICC LINE IS ESTABLISHED. PO AND 
IM MEDS TO CONTINUE AS ORDERED

## 2021-03-08 NOTE — NUR
Patient will be admitted to care of . Admited to MED/SURG.  Will go 
to room 105A. Belongings list completed.  Report to CARI SAMS.

## 2021-03-08 NOTE — NUR
MD ORDERED SLIDING SCALE AND LANTUS 20 UNITS AT BEDTIME. LANTUS 20 UNITS GIVEN PER MD ORDER. 
WILL CONTINUE TO MONITOR

## 2021-03-09 VITALS — SYSTOLIC BLOOD PRESSURE: 154 MMHG | DIASTOLIC BLOOD PRESSURE: 90 MMHG

## 2021-03-09 VITALS — SYSTOLIC BLOOD PRESSURE: 150 MMHG | DIASTOLIC BLOOD PRESSURE: 89 MMHG

## 2021-03-09 VITALS — SYSTOLIC BLOOD PRESSURE: 135 MMHG | DIASTOLIC BLOOD PRESSURE: 89 MMHG

## 2021-03-09 VITALS — SYSTOLIC BLOOD PRESSURE: 148 MMHG | DIASTOLIC BLOOD PRESSURE: 98 MMHG

## 2021-03-09 LAB
ALBUMIN FLD-MCNC: 1.3 G/DL (ref 3.4–5)
ANION GAP SERPL CALCULATED.3IONS-SCNC: 10.1 MMOL/L (ref 8–16)
AST SERPL-CCNC: 20 U/L (ref 15–37)
BASOPHILS # BLD AUTO: 0 K/UL (ref 0–0.22)
BASOPHILS NFR BLD AUTO: 0.7 % (ref 0–2)
BILIRUB SERPL-MCNC: 0 MG/DL (ref 0–1)
BUN SERPL-MCNC: 19 MG/DL (ref 7–18)
CHLORIDE SERPL-SCNC: 109 MMOL/L (ref 98–107)
CHOLEST/HDLC SERPL: 5.1 {RATIO} (ref 1–4.5)
CO2 SERPL-SCNC: 24.3 MMOL/L (ref 21–32)
CREAT SERPL-MCNC: 2 MG/DL (ref 0.6–1.3)
EOSINOPHIL # BLD AUTO: 0.6 K/UL (ref 0–0.4)
EOSINOPHIL NFR BLD AUTO: 12.8 % (ref 0–4)
ERYTHROCYTE [DISTWIDTH] IN BLOOD BY AUTOMATED COUNT: 15.6 % (ref 11.6–13.7)
GFR SERPL CREATININE-BSD FRML MDRD: 36 ML/MIN (ref 90–?)
GLUCOSE SERPL-MCNC: 206 MG/DL (ref 74–106)
HCT VFR BLD AUTO: 27.6 % (ref 36–48)
HDLC SERPL-MCNC: 32 MG/DL (ref 40–60)
HGB BLD-MCNC: 9.2 G/DL (ref 12–16)
LDLC SERPL CALC-MCNC: 89 MG/DL (ref 60–100)
LYMPHOCYTES # BLD AUTO: 1.9 K/UL (ref 2.5–16.5)
LYMPHOCYTES NFR BLD AUTO: 43.4 % (ref 20.5–51.1)
MAGNESIUM SERPL-MCNC: 1.7 MG/DL (ref 1.8–2.4)
MCH RBC QN AUTO: 29 PG (ref 27–31)
MCHC RBC AUTO-ENTMCNC: 34 G/DL (ref 33–37)
MCV RBC AUTO: 85.3 FL (ref 80–94)
MONOCYTES # BLD AUTO: 0.2 K/UL (ref 0.8–1)
MONOCYTES NFR BLD AUTO: 5.6 % (ref 1.7–9.3)
NEUTROPHILS # BLD AUTO: 1.6 K/UL (ref 1.8–7.7)
NEUTROPHILS NFR BLD AUTO: 37.5 % (ref 42.2–75.2)
PLATELET # BLD AUTO: 190 K/UL (ref 140–450)
POTASSIUM SERPL-SCNC: 3.4 MMOL/L (ref 3.5–5.1)
RBC # BLD AUTO: 3.24 MIL/UL (ref 4.2–5.4)
SODIUM SERPL-SCNC: 140 MMOL/L (ref 136–145)
TRIGL SERPL-MCNC: 214 MG/DL (ref 30–150)
WBC # BLD AUTO: 4.3 K/UL (ref 4.8–10.8)

## 2021-03-09 RX ADMIN — SODIUM CHLORIDE PRN MG: 9 INJECTION, SOLUTION INTRAVENOUS at 21:18

## 2021-03-09 RX ADMIN — SODIUM CHLORIDE PRN MG: 9 INJECTION, SOLUTION INTRAVENOUS at 14:07

## 2021-03-09 RX ADMIN — SODIUM CHLORIDE PRN MG: 9 INJECTION, SOLUTION INTRAVENOUS at 10:27

## 2021-03-09 RX ADMIN — SODIUM CHLORIDE PRN MG: 9 INJECTION, SOLUTION INTRAVENOUS at 09:53

## 2021-03-09 RX ADMIN — INSULIN GLARGINE SCH UNITS: 100 INJECTION, SOLUTION SUBCUTANEOUS at 21:10

## 2021-03-09 RX ADMIN — Medication SCH DEV: at 07:30

## 2021-03-09 RX ADMIN — INSULIN LISPRO PRN UNITS: 100 INJECTION, SOLUTION INTRAVENOUS; SUBCUTANEOUS at 17:15

## 2021-03-09 RX ADMIN — INSULIN LISPRO PRN UNITS: 100 INJECTION, SOLUTION INTRAVENOUS; SUBCUTANEOUS at 06:35

## 2021-03-09 RX ADMIN — Medication SCH DEV: at 21:06

## 2021-03-09 RX ADMIN — DOCUSATE SODIUM SCH MG: 100 CAPSULE, LIQUID FILLED ORAL at 09:53

## 2021-03-09 RX ADMIN — SODIUM CHLORIDE SCH MLS/HR: 9 INJECTION, SOLUTION INTRAVENOUS at 21:10

## 2021-03-09 RX ADMIN — Medication SCH DEV: at 17:17

## 2021-03-09 RX ADMIN — DEXTROSE SCH MLS/HR: 5 SOLUTION INTRAVENOUS at 09:56

## 2021-03-09 RX ADMIN — SODIUM CHLORIDE SCH MLS/HR: 9 INJECTION, SOLUTION INTRAVENOUS at 08:40

## 2021-03-09 RX ADMIN — Medication SCH DEV: at 11:30

## 2021-03-09 NOTE — NUR
PATIENT HAS BEEN SCREENED AND CATEGORIZED AS MODERATE NUTRITION RISK. PATIENT WILL BE SEEN 
WITHIN 3-5 DAYS OF ADMISSION.



03/11/21 03/13/21



BLANCA ROSENBERG [address]

## 2021-03-09 NOTE — NUR
RECEIVED PATIENT FROM NIGHT NURSE. PT IS ABLE TO MAKE NEEDS KNOWN. CALL LIGHT IS WITHIN 
REACH. ALL SAFETY MEASURES ARE IN PLACE. WILL CONTINUE TO MONITOR.

## 2021-03-09 NOTE — NUR
PT COMPLAINED OF NAUSEA. PRN GIVEN PER MD ORDER. K WAS LOW. MEDICATION GIVEN PER MD ORDER. 
PT EDUCATED AND VERBALIZED UNDERSTANDING. PT TOLERATED WELL. NO S/S OF DISTRESS. ALL SAFETY 
MEASURES ARE IN PLACE

## 2021-03-09 NOTE — NUR
MADE ROUNDS. PATIENT SLEEPING RESPIRATION EVEN UNLABORED ON ROOM AIR. NO DISTRESS NOTED. 
WILL CONTINUE TO MONITOR

## 2021-03-09 NOTE — NUR
MEDS GIVEN PER MD ORDER. PT EDUCATED AND VERBALIZED UNDERSTANDING.  ALL SAFETY MEASURES ARE 
IN PLACE. NO S/S OF DISTRESS WILL CONTINUE TO MONITOR.

## 2021-03-09 NOTE — NUR
BG ASSESSED,. INSULIN COVERAGE GIVEN PER PROTOCOL. PT EDUCATED. NO S/S OF DISTRESS. WILL 
CONTINUE TO MONITOR.

## 2021-03-09 NOTE — NUR
SOCIAL WORK NOTE:





Patient's Orientation 

Unable To Assess

Information Provided By 

DUANE SAMSON - SIGNIFICANT OTHER

Comments 

SW WAS UNABLE TO MEET PATIENT AT BEDSIDE. SW COMPLETED ASSESSMENT WITH 

PATIENT AT SIGNIFICANT OTHER.

, Realtionship and Phone Number 

DUANE SAMSON

SIGNIFICANT OTHER

[phone]

Healthcare Power of  

No

Does Patient Have a POLST 

No

Identifying Problems 

No Social Work Triggers

Is A Social Work Consult Needed 

No

Mandate Report Filed 

No

Explanation Of Identifying Problems 

PATIENT IS A 35-YEAR-OLD FEMALE ADMITTED FOR OSTEOMYELITIS. PATIENT HAS 

PMHX OF CHF AND DIABETES. SIGNIFICANT OTHER REPORTS NO HISTORY OF 

USBSTANCE ABUSE OR MENTAL HEALTH.

Admitted From 

Home

Pre-Admission Level Of Functioning Status 

Independent/Ambulatory

Prior Resources/Services Used In Last 12 Months 

No Prior Resources Used

Prior DME 

No Prior DME Used

Dialysis Comments 

N/A

Living Situation 

Lives W/Significant Other

House

Patient Had Caregiver 

No

Home Support 

No Caregiver Issues

Financial Issues 

No Known Financial Issue

Factors/Needs 

No D/C Needs Identified

Pt/Rep Participated In Discharge Plan 

Yes

Patient/Family Agress With Discharge Plan 

Yes

Discharge Plan Comments 

TENTATIVE DISCHARGE PLAN IS FOR PATIENT TO RETURN HOME.

DC Plan Status 

Initiated

## 2021-03-09 NOTE — NUR
RECD. RESTING IN BED, AWAKE, A/OX4. RESPIRATION EVEN AND UNLABORED. IV OF NS AT 80 ML/HR 
INFUSING, RIGHT UPPER ARM PICC LINE. LEFT FOOT WITH WOUND IN THE PLANTAR AREA COVERED WITH 
DRESSING DRY AND INTACT. RIGHT FOOT WITH SURGICAL WOUND COVERED WITH DRESSING DRY AND 
INTACT. MEDICATIONS AND CARE FOR THE SHIFT DISCUSSED. VERBALIZED UNDERSTANDING. PAIN IN HT 
RIGHT FOOT 1/10, WILL MEDICATE AS PER MD ORDER. SAFETY MEASURES ENFORCED. BED IN THE LOWEST 
POSITION, CALL LIGHT IN REACH. USES BSC.

## 2021-03-09 NOTE — NUR
DC PLANNIN YRS OLD FEMALE PATIENT WAS ADMITTED FROM HOME WITH A DX OF OSTEOMYELITIS. PT HAS A DM , 
PERIPHERAL NEUROPATHY, SUBSTANCE ABUSE AND CHF. CXR SHOWED NO ACUTE CARDIOPULMONARY DISEASE. 
TROP 0.113. COVID TEST NEGATIVE, ADMINISTERED IVF, IV ABX LEVAQUIN, VANCOMYCIN AND CONTINUE 
HOME MEDS. CONSULTED WITH ID, AND PODIATRY DC PLAN TO GO HOME WHEN STABLE CM TO FOLLOW

-------------------------------------------------------------------------------

Addendum: 21 at 1352 by Vy Baig CM

-------------------------------------------------------------------------------

DC PLANNER:

SCHEDULED PATIENT A FOLLOW UP APPOINTMENT 

APPOINTMENT INFORMATION:

PCP LETTY LOPEZ



2:00 PM 

[phone] [address] [address]

-------------------------------------------------------------------------------

Addendum: 21 at 1128 by Milad Andrade SS

-------------------------------------------------------------------------------

MACI TOLLIVER FROM PHYSICIANS OFFICE TO RESCHEDULE APPOINTMENT. PATIENT'S APPOINTMENT 
IS NOW AT  @ 2:10PM.

-------------------------------------------------------------------------------

Addendum: 21 at 1523 by Milad MANLEY

-------------------------------------------------------------------------------

MACI FAXED PHYSICIANS ORDER TO IE. MACI FAXED CLINICALS TO [facility] [phone]. 
[facility] CONTACT: [phone].

-------------------------------------------------------------------------------

Addendum: 21 at 1529 by Smiley Burdick RN

-------------------------------------------------------------------------------

DC PLANNING:

SPOKE WITH DR CHISHOLM FOR DC PLAN PER MD AWAITING FOR WOUND CULTURE STILL PENDING. CHARGE 
NURSE PLS CONTACT DR CUMMINGS FOR POSSIBLE ABX AND FAX IT TO [facility]  AND 
TO CALL  FOR ABX ORDER THE HOME HEALTH WILL BE [facility] . CM TO FOLLOW 

-------------------------------------------------------------------------------

Addendum: 21 at 1534 by Milad Andrade 

-------------------------------------------------------------------------------

SHAHEEN FROM [facility] STATED THAT HE CAN ACCEPT PATIENT. SHAHEEN PROVIDED CONTACT 
INFORMATION [phone]. MACI PROVIDED PHONE NUMBER TO HOSPITAL FLOOR [phone]. SHAHEEN 
STATED HE WILL CALL TOMORROW FOR DISCHARGE DATE.

-------------------------------------------------------------------------------

Addendum: 03/15/21 at 0920 by Vy Baig 

-------------------------------------------------------------------------------

BROWN BAIRES

RECEIVED ORDER FOR SNF PLACEMENT FOR IV ABX AND WOUND CARE. SPOKE TO PATIENT STATED THAT 
HOME HEALTH HAS ALREADY BEEN SET UP BUT SHE PREFERS SNF. WILL FAX CONTRACTED FACILITIES. 

-------------------------------------------------------------------------------

Addendum: 03/15/21 at 1017 by Vy Baig CM

-------------------------------------------------------------------------------

BROWN BAIRES:

FAXED TO [facility] WILL FOLLOW UP.

-------------------------------------------------------------------------------

Addendum: 03/15/21 at 1119 by Vy Baig CM

-------------------------------------------------------------------------------

BROWN BAIRES:

CONTACTED MU KNAPP [facility] TO NOTIFY HIM THAT PATIENT WILL NO LONGER NEED HOME HEALTH 
SERVICES. 

-------------------------------------------------------------------------------

Addendum: 03/15/21 at 1132 by Vy Baig CM

-------------------------------------------------------------------------------

BROWN BAIRES:

MU FROM [facility] STATED THAT HE CAN HELP US PLACE THIS PATENT IN SNF. FAXED CLINICALS 
WILL FOLLOW UP. 

-------------------------------------------------------------------------------

Addendum: 03/15/21 at 1305 by Vy Baig CM

-------------------------------------------------------------------------------

BROWN BAIRES:

RECEIVED A CALL FROM [facility] HE SENT THE REFERRAL TO [facility] AND [facility] 
THEY BOTH DENIED PATIENT BECAUSE OF HER DRUG ABUSE.

-------------------------------------------------------------------------------

Addendum: 03/15/21 at 1417 by Vy Baig CM

-------------------------------------------------------------------------------

BROWN BAIRES:

TAVO STREETER AT [facility] HER DON WAS IN A MEETING EARLIER AND SHE IS REVIEWING PATIENTS CLINICALS. 
I PROVIDED HER THE NUMBER FOR THE FLOOR TO FOLLOW UP WITH 

-------------------------------------------------------------------------------

Addendum: 21 at 1040 by Vy Baig CM

-------------------------------------------------------------------------------

BROWN BAIRES:

FOLLOWED UP WITH SYL SHE IS WORKING ON A ROOM FOR THIS PATIENT. ALSO FAXED TO [facility], [facility], [facility], [facility], AND [facility]. WILL FOLLOW 
UP.

-------------------------------------------------------------------------------

Addendum: 21 at 1301 by Vy Baig CM

-------------------------------------------------------------------------------

BROWN BAIRES:

PER SYL AT [facility] THEY DO NOT HAVE AN AVAILABLE BED AT THIS TIME. PER PRANEETH AT [facility] 
THEY CAN ACCEPT THIS PATIENT HOWEVER THEY ARE WORKING ON ROOM CHANGES AT THIS TIME. 

-------------------------------------------------------------------------------

Addendum: 21 at 1303 by Vy Baig CM

-------------------------------------------------------------------------------

BROWN BAIRES:

RECEIVED A PHONE CALL FROM [facility] THEY CAN NOT ACCEPT THIS PATIENT DUE TO HER AGE 
AND DRUG ABUSE. 

-------------------------------------------------------------------------------

Addendum: 21 at 1325 by Vy Baig CM

-------------------------------------------------------------------------------

BROWN BAIRES:

[facility] IS ABLE TO ACCEPT THIS PATIENT. [address]

-------------------------------------------------------------------------------

Addendum: 21 at 1334 by Vy Baig CM

-------------------------------------------------------------------------------

BROWN BAIRES:



[facility]

[address] [place] [address] [id number] [phone]

[address] 

-------------------------------------------------------------------------------

Addendum: 21 at 1407 by Vy Baig 

-------------------------------------------------------------------------------

BROWN BAIRES:

PENDING TRANSPORTATION AUTH FROM CARMEN [phone] AT [facility].

-------------------------------------------------------------------------------

Addendum: 21 at 1521 by Idalia Brunner 

-------------------------------------------------------------------------------

AUREA spoke with Carmen ([phone]) at [facility]; ARELYW provided verbal update. Carmen states that 
she has not yet received the referral; AUREA has refaxed the SNF referral to [facility]. Carmen 
states that she needs the dose and duration of antibiotics before she can give authorization 
to the SNF. [facility]A has placed call to pt's nurse for assistance. [facility] to have him paged regarding the clarification order for IV antibiotics.  

-------------------------------------------------------------------------------

Addendum: 21 at 1537 by Idalia Brunner CM

-------------------------------------------------------------------------------

Transportation auth: [id number]; transportation set up with [employer] for will call 
([phone]). [facility] spoke with Chilo at [facility] and provided auth: [id number]. Chilo 
states that pt is able to come today; please call Chilo/[facility] prior to alert [facility] 
of  time. [facility] has communicated with pt's nurse; pending DC order at this time with 
dose and duration of IV antibiotics. 

-------------------------------------------------------------------------------

Addendum: 21 at 0904 by Smiley Burdick RN

-------------------------------------------------------------------------------

DC PLANNING:

CALLED DR GALDAMEZ REGARDING A DISCHARGE ORDER THAT  SNF WAS AVAILABLE YESTERDAY. DR GALDAMEZ STATED DIDN'T GET ANY TEXT OR MESSAGE FROM THE NURSES. TELEPHONE ORDER OK TO DC TO 
SNF WITH IV ABX AND TO ASK  DR CUMMINGS FOR ANTIBIOTICS.. CALLED DR CUMMINGS AND ORDERED  
VANCOMYCIN CAN BE CONTINUED AS IV FOR 6 WEEKS FROM STARTING AND LEVAQUIN AND FLAGYL CAN BE 
PO FOR 6 WEEKS FROM THE START. CM TO FOLLOW 

-------------------------------------------------------------------------------

Addendum: 21 at 920 by Vy Baig CM

-------------------------------------------------------------------------------

BROWN BAIRES:

FAXED ORDER FOR IV ABX TO [facility] AND [facility]. ARRANGED TRANSPORTATION WITH [employer] FOR 1015 
AM. NOTIFIED FLAQUITA CHEW

-------------------------------------------------------------------------------

Addendum: 21 at 921 by Vy Baig CM

-------------------------------------------------------------------------------

BROWN BAIRES:

[facility]

[address] [place] [address]

[phone]

[address]

## 2021-03-10 VITALS — SYSTOLIC BLOOD PRESSURE: 123 MMHG | DIASTOLIC BLOOD PRESSURE: 72 MMHG

## 2021-03-10 VITALS — DIASTOLIC BLOOD PRESSURE: 71 MMHG | SYSTOLIC BLOOD PRESSURE: 115 MMHG

## 2021-03-10 VITALS — SYSTOLIC BLOOD PRESSURE: 155 MMHG | DIASTOLIC BLOOD PRESSURE: 83 MMHG

## 2021-03-10 VITALS — DIASTOLIC BLOOD PRESSURE: 94 MMHG | SYSTOLIC BLOOD PRESSURE: 152 MMHG

## 2021-03-10 LAB
ALBUMIN FLD-MCNC: 1.3 G/DL (ref 3.4–5)
ANION GAP SERPL CALCULATED.3IONS-SCNC: 13.3 MMOL/L (ref 8–16)
AST SERPL-CCNC: 33 U/L (ref 15–37)
BASOPHILS # BLD AUTO: 0 K/UL (ref 0–0.22)
BASOPHILS NFR BLD AUTO: 0.8 % (ref 0–2)
BILIRUB SERPL-MCNC: 0.1 MG/DL (ref 0–1)
BUN SERPL-MCNC: 23 MG/DL (ref 7–18)
CHLORIDE SERPL-SCNC: 109 MMOL/L (ref 98–107)
CO2 SERPL-SCNC: 23.6 MMOL/L (ref 21–32)
CREAT SERPL-MCNC: 1.9 MG/DL (ref 0.6–1.3)
EOSINOPHIL # BLD AUTO: 0.5 K/UL (ref 0–0.4)
EOSINOPHIL NFR BLD AUTO: 12 % (ref 0–4)
ERYTHROCYTE [DISTWIDTH] IN BLOOD BY AUTOMATED COUNT: 16.3 % (ref 11.6–13.7)
GFR SERPL CREATININE-BSD FRML MDRD: 38 ML/MIN (ref 90–?)
GLUCOSE SERPL-MCNC: 138 MG/DL (ref 74–106)
HCT VFR BLD AUTO: 31.2 % (ref 36–48)
HGB BLD-MCNC: 10.2 G/DL (ref 12–16)
LYMPHOCYTES # BLD AUTO: 1.2 K/UL (ref 2.5–16.5)
LYMPHOCYTES NFR BLD AUTO: 28.5 % (ref 20.5–51.1)
MAGNESIUM SERPL-MCNC: 1.7 MG/DL (ref 1.8–2.4)
MCH RBC QN AUTO: 28 PG (ref 27–31)
MCHC RBC AUTO-ENTMCNC: 33 G/DL (ref 33–37)
MCV RBC AUTO: 86.2 FL (ref 80–94)
MONOCYTES # BLD AUTO: 0.2 K/UL (ref 0.8–1)
MONOCYTES NFR BLD AUTO: 5 % (ref 1.7–9.3)
NEUTROPHILS # BLD AUTO: 2.3 K/UL (ref 1.8–7.7)
NEUTROPHILS NFR BLD AUTO: 53.7 % (ref 42.2–75.2)
PLATELET # BLD AUTO: 208 K/UL (ref 140–450)
POTASSIUM SERPL-SCNC: 3.9 MMOL/L (ref 3.5–5.1)
RBC # BLD AUTO: 3.62 MIL/UL (ref 4.2–5.4)
SODIUM SERPL-SCNC: 142 MMOL/L (ref 136–145)
WBC # BLD AUTO: 4.3 K/UL (ref 4.8–10.8)

## 2021-03-10 PROCEDURE — 0JBQ0ZZ EXCISION OF RIGHT FOOT SUBCUTANEOUS TISSUE AND FASCIA, OPEN APPROACH: ICD-10-PCS

## 2021-03-10 RX ADMIN — MUPIROCIN SCH GM: 2 CREAM TOPICAL at 16:52

## 2021-03-10 RX ADMIN — HYDROCODONE BITARTRATE AND ACETAMINOPHEN PRN TAB: 5; 325 TABLET ORAL at 16:53

## 2021-03-10 RX ADMIN — Medication SCH DEV: at 06:37

## 2021-03-10 RX ADMIN — Medication SCH DEV: at 17:16

## 2021-03-10 RX ADMIN — SODIUM CHLORIDE SCH MLS/HR: 9 INJECTION, SOLUTION INTRAVENOUS at 22:10

## 2021-03-10 RX ADMIN — SODIUM CHLORIDE SCH MLS/HR: 9 INJECTION, SOLUTION INTRAVENOUS at 20:35

## 2021-03-10 RX ADMIN — SODIUM CHLORIDE SCH MLS/HR: 9 INJECTION, SOLUTION INTRAVENOUS at 01:04

## 2021-03-10 RX ADMIN — CHLORHEXIDINE GLUCONATE SCH EA: 2 SOLUTION TOPICAL at 16:54

## 2021-03-10 RX ADMIN — Medication SCH DEV: at 20:46

## 2021-03-10 RX ADMIN — DOCUSATE SODIUM SCH MG: 100 CAPSULE, LIQUID FILLED ORAL at 09:00

## 2021-03-10 RX ADMIN — INSULIN LISPRO PRN UNITS: 100 INJECTION, SOLUTION INTRAVENOUS; SUBCUTANEOUS at 17:20

## 2021-03-10 RX ADMIN — INSULIN GLARGINE SCH UNITS: 100 INJECTION, SOLUTION SUBCUTANEOUS at 20:45

## 2021-03-10 RX ADMIN — SODIUM CHLORIDE PRN MG: 9 INJECTION, SOLUTION INTRAVENOUS at 09:08

## 2021-03-10 RX ADMIN — DEXTROSE SCH MLS/HR: 5 SOLUTION INTRAVENOUS at 09:10

## 2021-03-10 RX ADMIN — Medication SCH DEV: at 11:54

## 2021-03-10 RX ADMIN — SODIUM CHLORIDE SCH MLS/HR: 9 INJECTION, SOLUTION INTRAVENOUS at 16:54

## 2021-03-10 RX ADMIN — SODIUM CHLORIDE PRN MG: 9 INJECTION, SOLUTION INTRAVENOUS at 20:50

## 2021-03-10 NOTE — NUR
WOUND CARE CONSULT TO INFECTED FOOT NOT DONE. PT. SEEN AND FOLLOW UP BY IN HOUSE PODIATRY 
TEAM. SCHEDULE SURGERY AT 12PM ON 03/10/2021 BY DR. SIMEON GUERRA DPM.

## 2021-03-10 NOTE — NUR
PATIENT CALLED AND ASKING FOR A SANDWICH. RECHECKED THE PATIENT BLOOD SUGAR FIRST BECAUSE 
PATIENT WAS GIVEN A 20 UNITS LANTUS AS SCHEDULED AND DR MAE ORDERED TO GIVE 20 UNITS PER 
SLIDING SCALE (HUMALOG). PATIENT BLOOD SUGAR NOW . PATIENT MADE AWARE THAT SHE CANNOT 
HAVE A SANDWICH BECAUSE HER BLOOD SUGAR IS HIGH. PAGED DR MAE AND MADE AWARE OF THE 
PATIENT LATEST BLOOD SUGAR. AWAITING FOR MD TO CALL OR TEXT BACK.

## 2021-03-10 NOTE — NUR
Patient NPO for scheduled procedure per Dr Rocha for surgery scheduled at noon. Oral meds 
withheld with Heparin until after procedure.

## 2021-03-10 NOTE — NUR
ADMINISTERED ALL THE SCHEDULED MEDICATIONS EARLIER AS ORDERED. PATIENT TOLERATED IT WELL. NO 
ADVERSE REACTION AND COMPLAIN FROM THE PATIENT. CALL LIGHT WITHIN REACH.

## 2021-03-10 NOTE — NUR
Patient had surgical procedure to right foot. Dressing applied by surgeon is intact and has 
no drainage. Vitals stable, had complaints and was given medication as ordered. Blood sugar 
@352 and 10 units of insulin administered. Educated patient on maintaining blood sugar 
within parameters by following dietary recommendations and patient verbalized understanding.

## 2021-03-10 NOTE — NUR
RECEIVED PATIENT A/A/OX3, SITTING UP IN BED DURING ROUNDS AT THE CHANGE OF SHIFT. S/P I AND 
D OF THE RIGHT FOOT. DRESSINGS ON BOTH FEET C/D/I. + PALPABLE POPLITEAL PULSE BILATERAL. 
PATIENT ABLE TO WIGGLE TOES. DENIES ANY NUMBNESS AND TINGLING ON BLE. PATENT DENIES ANY PAIN 
AT THIS TIME. IVF INFUSING AS ORDERED. PATIENT BLOOD SUGAR . PAGED THE ON CALL MD MAE AND AWARE. ORDERED TO GIVE 20 UNITS OF THE INSULIN SLIDING SCALE. ISOLATION AND FALL 
PRECAUTION IMPLEMENTED. INSTRUCTED THE PT TO CALL FOR ASSISTANCE AT ALL TIMES. APTIENT 
VERBALIZED UNDERSTANDING WITH THE POC. CALL LIGHT WITHIN REACH. WILL CONTINUE POC.

## 2021-03-10 NOTE — NUR
WANTS TO EAT. INFORMED SHE IS SCHEDULED FOR INCISION AND DEBRIDEMENT TODAY AT 1200, AND SHE 
CANNOT EAT. STATED "IF I WILL NOT BE ALLOWED TO EAT, I WILL NOT HAVE DONE TODAY.". TRIED TO 
LOOK FOR PAGE/PHONE OF JOYA LOZOYA TO INQUIRED IF PATIENT CAN EAT.

## 2021-03-10 NOTE — NUR
RECEIVED CALL FROM LAB STATING PT IS POSITIVE FOR MRSA IN NARES. NURSE ASSIGNED AND MD MADE 
AWARE. AWAITING ORDERS

## 2021-03-10 NOTE — NUR
[facility]  SURGICAL INTENSIVE CARE UNIT (SICU)  ATTENDING PHYSICIAN CRITICAL CARE PROGRESS NOTE     I have examined the patient, reviewed the record, and discussed the case with the APN/  Resident. I have reviewed all relevant labs and imaging data. Please refer to the  APN/ resident's note. I agree with the  assessment and plan with the following corrections/ additions. The following summarizes my clinical findings and independent assessment. CC: septic shock    S. Pt is making good uop. PF ratio almost 100 today    HOSPITAL COURSE:   10/29 excisional deiridement of NSTI    ASSESSMENT:  Active Problems:    Necrotizing soft tissue infection  Resolved Problems:    * No resolved hospital problems. *       PLAN:  Sedation/ Pain: continue propofol/ fentanyl gtt  Alcohol abuse--mvi/ thiamine/ folate   scheduled phenobarbitol   ativan scheduled  Continue to wean propofol gtt       CV: hemodynamically improved  Off pressors  On vitamin C    Pulmonary: acute hypoxic resp failure  Continue full vent support  PEEP 14, FIO2 70%   PF ratio almost 100   Life threatening hypoxic respiration failure  Unable to wean at this time  On nimbex gtt      GI: NPO/ moderate calorie protein malnutrition-- tolerating  tube feeds    FEN: Uop excellent  +18L positive  Increase lasix gtt to 3 mg/hr for diruesis    ID: NSTI--continue meropenem   Daily daikens dressing changes  Unable to return to OR at this point secondary to respiratory failure and high vent setting        Endo: poorly controlled diabetes--hba1c 9.2  Continue insulin gtt  Blood sugars reasonably controlled    DVT prophylaxis--SCDS, lovenox  GI Prophylaxis--PPI  Lines--central line/ arterial line 10/29  CODE: FULL     DISPOSITION-Continue ICU Care    Critical care time exclusive of teaching and procedures = 35min     Pt needs continuous ICU monitoring because the patient is at risk for deterioration from a hypoxic respiratory failure standpoint. PATIENT CALLED STATED HER BLOOD SUGAR IS LOW. CHECKED BS - 39. WILL GIVE D50. Amanda Posadas MD, FACS  11/4/2018  9:47 AM

## 2021-03-11 VITALS — DIASTOLIC BLOOD PRESSURE: 84 MMHG | SYSTOLIC BLOOD PRESSURE: 141 MMHG

## 2021-03-11 VITALS — DIASTOLIC BLOOD PRESSURE: 79 MMHG | SYSTOLIC BLOOD PRESSURE: 118 MMHG

## 2021-03-11 VITALS — SYSTOLIC BLOOD PRESSURE: 117 MMHG | DIASTOLIC BLOOD PRESSURE: 73 MMHG

## 2021-03-11 LAB
ALBUMIN FLD-MCNC: 1.4 G/DL (ref 3.4–5)
ANION GAP SERPL CALCULATED.3IONS-SCNC: 12.4 MMOL/L (ref 8–16)
AST SERPL-CCNC: 22 U/L (ref 15–37)
BASOPHILS # BLD AUTO: 0 K/UL (ref 0–0.22)
BASOPHILS NFR BLD AUTO: 0.2 % (ref 0–2)
BILIRUB SERPL-MCNC: 0.1 MG/DL (ref 0–1)
BUN SERPL-MCNC: 29 MG/DL (ref 7–18)
CHLORIDE SERPL-SCNC: 104 MMOL/L (ref 98–107)
CO2 SERPL-SCNC: 20.9 MMOL/L (ref 21–32)
CREAT SERPL-MCNC: 2.1 MG/DL (ref 0.6–1.3)
EOSINOPHIL # BLD AUTO: 0 K/UL (ref 0–0.4)
EOSINOPHIL NFR BLD AUTO: 0.2 % (ref 0–4)
ERYTHROCYTE [DISTWIDTH] IN BLOOD BY AUTOMATED COUNT: 15.4 % (ref 11.6–13.7)
GFR SERPL CREATININE-BSD FRML MDRD: 34 ML/MIN (ref 90–?)
GLUCOSE SERPL-MCNC: 230 MG/DL (ref 74–106)
HCT VFR BLD AUTO: 27.9 % (ref 36–48)
HGB BLD-MCNC: 9.2 G/DL (ref 12–16)
LYMPHOCYTES # BLD AUTO: 1 K/UL (ref 2.5–16.5)
LYMPHOCYTES NFR BLD AUTO: 16.4 % (ref 20.5–51.1)
MAGNESIUM SERPL-MCNC: 1.6 MG/DL (ref 1.8–2.4)
MCH RBC QN AUTO: 28 PG (ref 27–31)
MCHC RBC AUTO-ENTMCNC: 33 G/DL (ref 33–37)
MCV RBC AUTO: 85.9 FL (ref 80–94)
MONOCYTES # BLD AUTO: 0.3 K/UL (ref 0.8–1)
MONOCYTES NFR BLD AUTO: 5.3 % (ref 1.7–9.3)
NEUTROPHILS # BLD AUTO: 4.7 K/UL (ref 1.8–7.7)
NEUTROPHILS NFR BLD AUTO: 77.9 % (ref 42.2–75.2)
PLATELET # BLD AUTO: 196 K/UL (ref 140–450)
POTASSIUM SERPL-SCNC: 4.3 MMOL/L (ref 3.5–5.1)
RBC # BLD AUTO: 3.25 MIL/UL (ref 4.2–5.4)
SODIUM SERPL-SCNC: 133 MMOL/L (ref 136–145)
WBC # BLD AUTO: 6.1 K/UL (ref 4.8–10.8)

## 2021-03-11 RX ADMIN — Medication SCH DEV: at 16:22

## 2021-03-11 RX ADMIN — INSULIN LISPRO PRN UNITS: 100 INJECTION, SOLUTION INTRAVENOUS; SUBCUTANEOUS at 20:26

## 2021-03-11 RX ADMIN — SODIUM CHLORIDE PRN MG: 9 INJECTION, SOLUTION INTRAVENOUS at 20:16

## 2021-03-11 RX ADMIN — INSULIN GLARGINE SCH UNITS: 100 INJECTION, SOLUTION SUBCUTANEOUS at 20:25

## 2021-03-11 RX ADMIN — SODIUM CHLORIDE SCH MLS/HR: 9 INJECTION, SOLUTION INTRAVENOUS at 20:21

## 2021-03-11 RX ADMIN — INSULIN LISPRO PRN UNITS: 100 INJECTION, SOLUTION INTRAVENOUS; SUBCUTANEOUS at 12:09

## 2021-03-11 RX ADMIN — Medication SCH DEV: at 12:05

## 2021-03-11 RX ADMIN — Medication SCH DEV: at 06:00

## 2021-03-11 RX ADMIN — SODIUM CHLORIDE PRN MG: 9 INJECTION, SOLUTION INTRAVENOUS at 05:45

## 2021-03-11 RX ADMIN — Medication SCH DEV: at 20:27

## 2021-03-11 RX ADMIN — SODIUM CHLORIDE SCH MLS/HR: 9 INJECTION, SOLUTION INTRAVENOUS at 05:47

## 2021-03-11 RX ADMIN — INSULIN LISPRO PRN UNITS: 100 INJECTION, SOLUTION INTRAVENOUS; SUBCUTANEOUS at 16:27

## 2021-03-11 RX ADMIN — MUPIROCIN SCH GM: 2 CREAM TOPICAL at 13:26

## 2021-03-11 RX ADMIN — INSULIN LISPRO PRN UNITS: 100 INJECTION, SOLUTION INTRAVENOUS; SUBCUTANEOUS at 06:00

## 2021-03-11 RX ADMIN — SODIUM CHLORIDE PRN MG: 9 INJECTION, SOLUTION INTRAVENOUS at 09:16

## 2021-03-11 RX ADMIN — DOCUSATE SODIUM SCH MG: 100 CAPSULE, LIQUID FILLED ORAL at 08:19

## 2021-03-11 RX ADMIN — CHLORHEXIDINE GLUCONATE SCH EA: 2 SOLUTION TOPICAL at 13:31

## 2021-03-11 NOTE — NUR
ADMINISTERED ALL THE SCHEDULED MEDICATIONS EARLIER AS ORDERED. PATIENT TOLERATED IT WELL. NO 
ADVERSE REACTION NOTED AND NO COMPLAIN FROM THE PATIENT. CALL LIGHT WITHIN REACH.

## 2021-03-11 NOTE — NUR
MEDICATIONS GIVEN PER MD ORDER. PT EDUCATED AND VERBALIZED UNDERSTANDING. PT TOLERATED WELL. 
NO S/S OF DISTRESS. CALL LIGHT IS WITHIN REACH. ALL SAFETY MEASURES IN PLACE. WILL CONTINUE 
TO MONITOR.

## 2021-03-11 NOTE — NUR
PT RECEIVED FROM NIGHT NURSE. PT IS RESTING IN BED. NO S/S OF DISTRESS. CALL LIGHT IS WITHIN 
REACH. ALL SAFETY MEASURES IN PLACE. PT IS ABLE TO MAKE NEEDS KNOWN. WILL CONTINUE TO 
MONITOR.

## 2021-03-11 NOTE — NUR
PT REASSESSED FOR PAIN. PT DENIED ANY AT THIS TIME. PT WAS ASLEEP BUT EASY TO AROUSE. NO S/S 
OF DISTRESS. CALL LIGHT IS WITHIN REACH. ALL SAFETY MEASURES IN PLACE. WILL CONTINUE TO 
MONITOR.

## 2021-03-11 NOTE — NUR
PT IS SITTING IN BED LISTENING TO MUSIC. COMPLAINS OF DISCOMFORT BUT REFUSED PAIN 
MEDICATION. PT EDUCATED. NO S/S OF DISTRESS. CALL LIGHT IS WITHIN REACH. ALL SAFETY MEASURES 
IN PLACE. WILL CONTINUE TO MONITOR.

## 2021-03-11 NOTE — NUR
MEDICATION GIVEN PER MD ORDER. PT EDUCATED. NEEDS REINFORCEMENT.PT IS RESTING IN BED. NO S/S 
OF DISTRESS. CALL LIGHT IS WITHIN REACH. ALL SAFETY MEASURES IN PLACE. WILL CONTINUE TO 
MONITOR.

## 2021-03-11 NOTE — NUR
PT ASKED FOR A SANDWICH . PT EDUCATED ON DIET. PT NEEDS REINFORCEMENT. APPROPRIATE SNACK WAS 
PROVIDED. NO S/S OF DISTRESS. CALL LIGHT IS WITHIN REACH. ALL SAFETY MEASURES IN PLACE. WILL 
CONTINUE TO MONITOR.

## 2021-03-11 NOTE — NUR
PATIENT STABLE. NO COMPLAIN AT THIS TIME. NO ACUTE EVENT THROUGHOUT THE NIGHT. CALL LIGHT 
WITHIN REACH. WILL ENDORSE THE PT TO THE ONCOMING RN FOR CONTINUITY OF CARE.

## 2021-03-11 NOTE — NUR
BLOOD GLUCOSE ASSESSED . INSULIN COVERAGE GIVEN PER PROTOCOL. PT EDUCATED. NEEDS 
REINFORCEMENT.PT IS RESTING IN BED. NO S/S OF DISTRESS. CALL LIGHT IS WITHIN REACH. ALL 
SAFETY MEASURES IN PLACE. WILL CONTINUE TO MONITOR.

## 2021-03-11 NOTE — NUR
DR MAE TEXTED BACK AND AWARE OF THE PATIENT LATEST BLOOD SUGAR 464. MD ORDERED TO GIVE 
ADDITIONAL 10 UNITS OF INSULIN LISPRO SLIDING SCALE.

## 2021-03-11 NOTE — NUR
RECEIVED PATIENT A/A/OX3, SITTING UP IN BED DURING ROUNDS AT THE CHANGE OF SHIFT. S/P I AND 
D OF THE RIGHT FOOT 3/10/21. DRESSINGS ON BOTH FEET C/D/I. + PALPABLE POPLITEAL PULSE 
BILATERAL. PATIENT ABLE TO WIGGLE TOES. DENIES ANY NUMBNESS AND TINGLING ON BLE. VSS, 
AFEBRILE, SATING 100% ON RA. PATENT C/O PAIN ON HER RT FOOT. WILL MEDICATED THE PT FOR PAIN 
AS ORDERED. IVF INFUSING AS ORDERED.  ISOLATION AND FALL PRECAUTION IMPLEMENTED. INSTRUCTED 
THE PT TO CALL FOR ASSISTANCE AT ALL TIMES. PATIENT VERBALIZED UNDERSTANDING WITH THE POC. 
CALL LIGHT WITHIN REACH. WILL CONTINUE POC.

## 2021-03-12 VITALS — SYSTOLIC BLOOD PRESSURE: 145 MMHG | DIASTOLIC BLOOD PRESSURE: 79 MMHG

## 2021-03-12 VITALS — DIASTOLIC BLOOD PRESSURE: 69 MMHG | SYSTOLIC BLOOD PRESSURE: 115 MMHG

## 2021-03-12 VITALS — DIASTOLIC BLOOD PRESSURE: 94 MMHG | SYSTOLIC BLOOD PRESSURE: 169 MMHG

## 2021-03-12 LAB
ALBUMIN FLD-MCNC: 1.1 G/DL (ref 3.4–5)
ANION GAP SERPL CALCULATED.3IONS-SCNC: 11.5 MMOL/L (ref 8–16)
AST SERPL-CCNC: 24 U/L (ref 15–37)
BASOPHILS # BLD AUTO: 0 K/UL (ref 0–0.22)
BASOPHILS NFR BLD AUTO: 0.8 % (ref 0–2)
BILIRUB SERPL-MCNC: 0 MG/DL (ref 0–1)
BUN SERPL-MCNC: 31 MG/DL (ref 7–18)
CHLORIDE SERPL-SCNC: 114 MMOL/L (ref 98–107)
CO2 SERPL-SCNC: 19.6 MMOL/L (ref 21–32)
CREAT SERPL-MCNC: 2.2 MG/DL (ref 0.6–1.3)
EOSINOPHIL # BLD AUTO: 0.4 K/UL (ref 0–0.4)
EOSINOPHIL NFR BLD AUTO: 7.9 % (ref 0–4)
ERYTHROCYTE [DISTWIDTH] IN BLOOD BY AUTOMATED COUNT: 16 % (ref 11.6–13.7)
GFR SERPL CREATININE-BSD FRML MDRD: 32 ML/MIN (ref 90–?)
GLUCOSE SERPL-MCNC: 126 MG/DL (ref 74–106)
HCT VFR BLD AUTO: 25 % (ref 36–48)
HGB BLD-MCNC: 8.3 G/DL (ref 12–16)
LYMPHOCYTES # BLD AUTO: 2.2 K/UL (ref 2.5–16.5)
LYMPHOCYTES NFR BLD AUTO: 41 % (ref 20.5–51.1)
MAGNESIUM SERPL-MCNC: 2.3 MG/DL (ref 1.8–2.4)
MCH RBC QN AUTO: 29 PG (ref 27–31)
MCHC RBC AUTO-ENTMCNC: 33 G/DL (ref 33–37)
MCV RBC AUTO: 86.7 FL (ref 80–94)
MONOCYTES # BLD AUTO: 0.3 K/UL (ref 0.8–1)
MONOCYTES NFR BLD AUTO: 4.8 % (ref 1.7–9.3)
NEUTROPHILS # BLD AUTO: 2.5 K/UL (ref 1.8–7.7)
NEUTROPHILS NFR BLD AUTO: 45.5 % (ref 42.2–75.2)
PLATELET # BLD AUTO: 185 K/UL (ref 140–450)
POTASSIUM SERPL-SCNC: 4.1 MMOL/L (ref 3.5–5.1)
RBC # BLD AUTO: 2.88 MIL/UL (ref 4.2–5.4)
SODIUM SERPL-SCNC: 141 MMOL/L (ref 136–145)
WBC # BLD AUTO: 5.4 K/UL (ref 4.8–10.8)

## 2021-03-12 RX ADMIN — Medication SCH DEV: at 20:43

## 2021-03-12 RX ADMIN — SODIUM CHLORIDE PRN MG: 9 INJECTION, SOLUTION INTRAVENOUS at 20:35

## 2021-03-12 RX ADMIN — SODIUM CHLORIDE SCH MLS/HR: 9 INJECTION, SOLUTION INTRAVENOUS at 23:39

## 2021-03-12 RX ADMIN — Medication SCH DEV: at 11:27

## 2021-03-12 RX ADMIN — DOCUSATE SODIUM SCH MG: 100 CAPSULE, LIQUID FILLED ORAL at 08:31

## 2021-03-12 RX ADMIN — SODIUM CHLORIDE PRN MG: 9 INJECTION, SOLUTION INTRAVENOUS at 05:04

## 2021-03-12 RX ADMIN — Medication SCH DEV: at 06:03

## 2021-03-12 RX ADMIN — DEXTROSE SCH MLS/HR: 5 SOLUTION INTRAVENOUS at 08:31

## 2021-03-12 RX ADMIN — INSULIN LISPRO PRN UNITS: 100 INJECTION, SOLUTION INTRAVENOUS; SUBCUTANEOUS at 20:42

## 2021-03-12 RX ADMIN — SODIUM CHLORIDE PRN MG: 9 INJECTION, SOLUTION INTRAVENOUS at 20:33

## 2021-03-12 RX ADMIN — Medication SCH DEV: at 16:42

## 2021-03-12 RX ADMIN — SODIUM CHLORIDE SCH MLS/HR: 9 INJECTION, SOLUTION INTRAVENOUS at 11:32

## 2021-03-12 RX ADMIN — INSULIN LISPRO PRN UNITS: 100 INJECTION, SOLUTION INTRAVENOUS; SUBCUTANEOUS at 16:45

## 2021-03-12 RX ADMIN — SODIUM CHLORIDE PRN MG: 9 INJECTION, SOLUTION INTRAVENOUS at 12:53

## 2021-03-12 RX ADMIN — INSULIN GLARGINE SCH UNITS: 100 INJECTION, SOLUTION SUBCUTANEOUS at 20:41

## 2021-03-12 RX ADMIN — MUPIROCIN SCH GM: 2 CREAM TOPICAL at 12:53

## 2021-03-12 RX ADMIN — INSULIN LISPRO PRN UNITS: 100 INJECTION, SOLUTION INTRAVENOUS; SUBCUTANEOUS at 11:28

## 2021-03-12 RX ADMIN — CHLORHEXIDINE GLUCONATE SCH EA: 2 SOLUTION TOPICAL at 12:54

## 2021-03-12 NOTE — NUR
PATIENT % OF BREAKFAST. NO SIGNS OF DISTRESS. REPORTS NO PAIN. ADMINISTERED SCHEDULES 
AM MEDS. ASSISTED PATIENT TO THE RESTROOM. WILL CONTINUE TO MONITOR.

## 2021-03-12 NOTE — NUR
RECEIVED ENDORSEMENT FROM CASE MANAGEMENT [facility] REGARDING WHEN PT BLOOD CULTURE COMES BACK TO 
CALL DR CUMMINGS AND TO FAX MEDICATION ORDER TO [facility] @ [phone].

## 2021-03-12 NOTE — NUR
PATIENT SHOWS NO SIGN OF DISTRESS OR PAIN. CHECKED BLOOD SUGAR, 191, COVERED WITH 2 UNITS 
PER SLIDING SCALE. ADMINISTERED EVENING MEDICATIONS. WILL CONTINUE TO MONITOR.

## 2021-03-12 NOTE — NUR
3/12/21 RD INITIAL ASSESSMENT COMPLETED



PLEASE REFER TO NUTRITION ASSESSMENT UNDER CARE ACTIVITY FOR ESTIMATED NUTRITIONAL NEEDS. 



1. CONTINUE CARDIAC CCHO DIET AS TOLERATED 

2. RECOMMEND ASHLYN BID

3. RD PROVIDE NUTRITION EDUCATION FOR CONSISTENT CARBOHYDRATE INTAKE AND CHF. PT ACCEPTED.

4. RD TO FOLLOW-UP 3-5 DAYS, MODERATE RISK 



BLANCA ROSENBERG RD

## 2021-03-12 NOTE — NUR
PATIENT SHOWED NO SIGN OF DISTRESS. COMPLAINT OF PAIN, LEVEL 10, GAVE MORPHINE. ADMINISTERED 
AFTERNOON SCHEDULED MEDS. WILL CONTINUE TO MONITOR.

## 2021-03-12 NOTE — NUR
TALKED TO DR CHISHOLM REGARDING PT NEEDS ECHO ORDERED, PER  PT CAME IN FOR OSTEOMYLITIS 
THERE IS NO INDICATION FOR ECHO, PT SHOULD GET ECHO AS OUT PT.

## 2021-03-12 NOTE — NUR
RECEIVED REPORT FROM NIGHT NURSE FOR CONTINUITY OF CARE. PATIENT STABLE AND SLEEPING. WILL 
CONTINUE TO MONITOR.

## 2021-03-12 NOTE — NUR
ENDORSED CARE TO NIGHT SHIFT RN FOR CONTINUITY OF CARE. PATIENT STABLE AND COMFORTABLE, 
SHOWING NO SIGNS OF DISTRESS OR PAIN.

## 2021-03-12 NOTE — NUR
RECEIVED PT A/A/AOX4, AMBULATED TO THE BATHROOM AND TOLERATED IT WELL. NO SIGN AND SYMPTOMS 
OF DISTRESS NOTED. NO COMPLAIN AT THIS TIME. IVF INFUSING AS ORDERED. FALL PRECAUTION AND 
ISOLATION PRECAUTION IMPLEMENTED. INSTRUCTED TO CALL FOR ASSISTANCE AT ALL TIMES. CALL LIGHT 
WITHIN REACH. WILL CONTINUE POC.

## 2021-03-12 NOTE — NUR
ADMINISTERED ALL THE SCHEDULED MEDICATIONS EARLIER AS ORDERED. PATIENT TOLERATED IT WELL. NO 
ADVERSE DRUG REACTION NOTED AND NO COMPLAIN FROM THE PATIENT. CALL LIGHT WITHIN REACH.

## 2021-03-12 NOTE — NUR
CHECKED BLOOD SUGAR, 271, COVERED WITH 6 UNITS PER SLIDING SCALE. NS BAG EMPTY, REPLACED 
WITH NEW BAG OF NS. PATIENT SHOWS NO SIGNS OF DISTRESS AND IS RESTING. WILL CONTINUE TO 
MONITOR.

## 2021-03-13 VITALS — SYSTOLIC BLOOD PRESSURE: 132 MMHG | DIASTOLIC BLOOD PRESSURE: 79 MMHG

## 2021-03-13 VITALS — SYSTOLIC BLOOD PRESSURE: 150 MMHG | DIASTOLIC BLOOD PRESSURE: 86 MMHG

## 2021-03-13 VITALS — DIASTOLIC BLOOD PRESSURE: 91 MMHG | SYSTOLIC BLOOD PRESSURE: 152 MMHG

## 2021-03-13 LAB
ALBUMIN FLD-MCNC: 1.2 G/DL (ref 3.4–5)
ANION GAP SERPL CALCULATED.3IONS-SCNC: 10.4 MMOL/L (ref 8–16)
AST SERPL-CCNC: 27 U/L (ref 15–37)
BASOPHILS # BLD AUTO: 0 K/UL (ref 0–0.22)
BASOPHILS NFR BLD AUTO: 1 % (ref 0–2)
BILIRUB SERPL-MCNC: 0.1 MG/DL (ref 0–1)
BUN SERPL-MCNC: 41 MG/DL (ref 7–18)
CHLORIDE SERPL-SCNC: 112 MMOL/L (ref 98–107)
CO2 SERPL-SCNC: 21.7 MMOL/L (ref 21–32)
CREAT SERPL-MCNC: 2.3 MG/DL (ref 0.6–1.3)
EOSINOPHIL # BLD AUTO: 0.6 K/UL (ref 0–0.4)
EOSINOPHIL NFR BLD AUTO: 12.1 % (ref 0–4)
ERYTHROCYTE [DISTWIDTH] IN BLOOD BY AUTOMATED COUNT: 16.2 % (ref 11.6–13.7)
GFR SERPL CREATININE-BSD FRML MDRD: 31 ML/MIN (ref 90–?)
GLUCOSE SERPL-MCNC: 115 MG/DL (ref 74–106)
HCT VFR BLD AUTO: 25.2 % (ref 36–48)
HGB BLD-MCNC: 8.2 G/DL (ref 12–16)
LYMPHOCYTES # BLD AUTO: 1.9 K/UL (ref 2.5–16.5)
LYMPHOCYTES NFR BLD AUTO: 40.2 % (ref 20.5–51.1)
MAGNESIUM SERPL-MCNC: 2.2 MG/DL (ref 1.8–2.4)
MCH RBC QN AUTO: 29 PG (ref 27–31)
MCHC RBC AUTO-ENTMCNC: 33 G/DL (ref 33–37)
MCV RBC AUTO: 87.1 FL (ref 80–94)
MONOCYTES # BLD AUTO: 0.3 K/UL (ref 0.8–1)
MONOCYTES NFR BLD AUTO: 6.1 % (ref 1.7–9.3)
NEUTROPHILS # BLD AUTO: 1.9 K/UL (ref 1.8–7.7)
NEUTROPHILS NFR BLD AUTO: 40.6 % (ref 42.2–75.2)
PLATELET # BLD AUTO: 188 K/UL (ref 140–450)
POTASSIUM SERPL-SCNC: 5.1 MMOL/L (ref 3.5–5.1)
RBC # BLD AUTO: 2.9 MIL/UL (ref 4.2–5.4)
SODIUM SERPL-SCNC: 139 MMOL/L (ref 136–145)
WBC # BLD AUTO: 4.7 K/UL (ref 4.8–10.8)

## 2021-03-13 RX ADMIN — SODIUM CHLORIDE PRN MG: 9 INJECTION, SOLUTION INTRAVENOUS at 21:06

## 2021-03-13 RX ADMIN — INSULIN LISPRO PRN UNITS: 100 INJECTION, SOLUTION INTRAVENOUS; SUBCUTANEOUS at 16:10

## 2021-03-13 RX ADMIN — DOCUSATE SODIUM SCH MG: 100 CAPSULE, LIQUID FILLED ORAL at 09:05

## 2021-03-13 RX ADMIN — MUPIROCIN SCH GM: 2 CREAM TOPICAL at 12:34

## 2021-03-13 RX ADMIN — Medication SCH DEV: at 06:12

## 2021-03-13 RX ADMIN — CHLORHEXIDINE GLUCONATE SCH EA: 2 SOLUTION TOPICAL at 12:35

## 2021-03-13 RX ADMIN — SODIUM CHLORIDE SCH MLS/HR: 9 INJECTION, SOLUTION INTRAVENOUS at 11:23

## 2021-03-13 RX ADMIN — Medication SCH DEV: at 11:14

## 2021-03-13 RX ADMIN — Medication SCH DEV: at 19:59

## 2021-03-13 RX ADMIN — INSULIN LISPRO PRN UNITS: 100 INJECTION, SOLUTION INTRAVENOUS; SUBCUTANEOUS at 11:17

## 2021-03-13 RX ADMIN — SODIUM CHLORIDE PRN MG: 9 INJECTION, SOLUTION INTRAVENOUS at 11:18

## 2021-03-13 RX ADMIN — DEXTROSE SCH MLS/HR: 5 SOLUTION INTRAVENOUS at 09:17

## 2021-03-13 RX ADMIN — SODIUM CHLORIDE PRN MG: 9 INJECTION, SOLUTION INTRAVENOUS at 06:50

## 2021-03-13 RX ADMIN — Medication SCH DEV: at 16:09

## 2021-03-13 RX ADMIN — INSULIN GLARGINE SCH UNITS: 100 INJECTION, SOLUTION SUBCUTANEOUS at 19:59

## 2021-03-13 RX ADMIN — INSULIN LISPRO PRN UNITS: 100 INJECTION, SOLUTION INTRAVENOUS; SUBCUTANEOUS at 20:03

## 2021-03-13 NOTE — NUR
RECEIVED BEDSIDE REPORT FROM DAY SHIFT NURSE FOR CONTINUITY OF CARE. PT IS AWAKE AND ALERT, 
A&OX4. ON RA WITH BREATHING UNLABORED. PT DENIES PAIN AT THIS TIME. MED-SURG PT. PT IS 
AMBULATORY WITH ASSIST, NON WEIGHT BEARING ON THE RIGHT FOOT. SKIN IS WARM AND DRY. S/P 
RIGHT PLANTAR FOOT DEBRIDEMENT. RIGHT UPPER ARM PICC LINE IN PLACE RUNNING NS AT 80 ML PER 
HOUR PER ORDER. RAPID COVID NEGATIVE. FALL PRECAUTIONS IN PLACE. CONTACT PRECAUTIONS IN 
PLACE FOR POSITIVE MRSA NARES. PT IS STABLE AT THIS TIME. PLAN OF CARE DISCUSSED.

## 2021-03-13 NOTE — NUR
PT WAS GIVEN A PAIN MEDICATION VIA IVP, BP /98, PULSE IS 91, O2 SATURATION IS % 
ON RA AND RESPIRATION IS ZEV 18/MIN, 6 UNITS INSULIN WAS GIVEN AS WELL ON THE SUKI FOR BLOOD 
GLUCOSE , WILL CONTINUE TO MONITOR PT.

## 2021-03-13 NOTE — NUR
PT WAS GIVEN THE SCHEDULED FLAGYL MEDICATION PO AND WAS GIVEN 2 UNITS INSULIN ON THE LEFT UA 
FOR BLOOD GLUCOSE , WILL CONTINUE TO MONITOR PT.

## 2021-03-13 NOTE — NUR
PT WAS GIVEN THE SCHEDULED AM MEDICATIONS VIA ORAL AND IVPB, TOLERATED, WILL CONTINUE TO 
MONITOR PT.

## 2021-03-13 NOTE — NUR
PT STATES SHE HAS PAIN IN HER RIGHT FOOT AT A SCALE OF 8/10. PT STATES THE PAIN IS ACHING IN 
CHARACTERISTIC. SHE WAS GIVEN MORPHINE 4 MG IVP AS ORDERED FOR SEVERE PAIN. BP /91 
PRIOR TO ADMINISTRATION. WILL MONITOR PAIN.

## 2021-03-13 NOTE — NUR
ROUNDED ON PT. SHE IS SITING UP IN BED, WATCHING TV ON HER CELL PHONE. PT IS STABLE AT THIS 
TIME. DENIES PAIN. BREATHING IS UNLABORED. FALL PRECAUTIONS IN PLACE.

## 2021-03-13 NOTE — NUR
RECEIVED PT FROM NIGHT SHIFT NURSE, FAREED, PT IS AWAKE AND ALERT AND ON RA, FLIP PICC LINE 
NOTED, WITH IVF INFUSING AT 80ML/HR, RLE DRESSING INTACT AND IN PLACE, POT OP SHOE ON 
BEDSIDE, PT DENIES PAIN AND NO SIGN OF DISTRESS NOTED, SIDE RAILS UP AND CALL LIGHT WITHIN 
REACH WILL CONTINUE TO MONITOR PT.

## 2021-03-13 NOTE — NUR
PATIENT VITAL SIGNS STABLE, AFEBRILE, SATING 100% ON RA. NO COMPLAIN OF PAIN AT THIS TIME. 
CALL LIGHT WITHIN REACH.

## 2021-03-14 VITALS — SYSTOLIC BLOOD PRESSURE: 87 MMHG | DIASTOLIC BLOOD PRESSURE: 55 MMHG

## 2021-03-14 VITALS — SYSTOLIC BLOOD PRESSURE: 136 MMHG | DIASTOLIC BLOOD PRESSURE: 79 MMHG

## 2021-03-14 VITALS — DIASTOLIC BLOOD PRESSURE: 74 MMHG | SYSTOLIC BLOOD PRESSURE: 137 MMHG

## 2021-03-14 VITALS — DIASTOLIC BLOOD PRESSURE: 95 MMHG | SYSTOLIC BLOOD PRESSURE: 164 MMHG

## 2021-03-14 VITALS — SYSTOLIC BLOOD PRESSURE: 148 MMHG | DIASTOLIC BLOOD PRESSURE: 85 MMHG

## 2021-03-14 RX ADMIN — INSULIN LISPRO PRN UNITS: 100 INJECTION, SOLUTION INTRAVENOUS; SUBCUTANEOUS at 18:15

## 2021-03-14 RX ADMIN — Medication SCH DEV: at 20:59

## 2021-03-14 RX ADMIN — CHLORHEXIDINE GLUCONATE SCH EA: 2 SOLUTION TOPICAL at 12:03

## 2021-03-14 RX ADMIN — INSULIN LISPRO PRN UNITS: 100 INJECTION, SOLUTION INTRAVENOUS; SUBCUTANEOUS at 11:25

## 2021-03-14 RX ADMIN — DOCUSATE SODIUM SCH MG: 100 CAPSULE, LIQUID FILLED ORAL at 08:58

## 2021-03-14 RX ADMIN — Medication SCH DEV: at 06:33

## 2021-03-14 RX ADMIN — Medication SCH DEV: at 11:25

## 2021-03-14 RX ADMIN — SODIUM CHLORIDE SCH MLS/HR: 9 INJECTION, SOLUTION INTRAVENOUS at 12:04

## 2021-03-14 RX ADMIN — DEXTROSE SCH MLS/HR: 5 SOLUTION INTRAVENOUS at 08:59

## 2021-03-14 RX ADMIN — SODIUM CHLORIDE PRN MG: 9 INJECTION, SOLUTION INTRAVENOUS at 15:08

## 2021-03-14 RX ADMIN — SODIUM CHLORIDE PRN MG: 9 INJECTION, SOLUTION INTRAVENOUS at 06:17

## 2021-03-14 RX ADMIN — HYDROCODONE BITARTRATE AND ACETAMINOPHEN PRN TAB: 5; 325 TABLET ORAL at 11:37

## 2021-03-14 RX ADMIN — SODIUM CHLORIDE PRN MG: 9 INJECTION, SOLUTION INTRAVENOUS at 13:10

## 2021-03-14 RX ADMIN — INSULIN GLARGINE SCH UNITS: 100 INJECTION, SOLUTION SUBCUTANEOUS at 20:58

## 2021-03-14 RX ADMIN — Medication SCH DEV: at 18:14

## 2021-03-14 RX ADMIN — SODIUM CHLORIDE PRN MG: 9 INJECTION, SOLUTION INTRAVENOUS at 22:06

## 2021-03-14 RX ADMIN — SODIUM CHLORIDE PRN MG: 9 INJECTION, SOLUTION INTRAVENOUS at 18:19

## 2021-03-14 RX ADMIN — HYDROCODONE BITARTRATE AND ACETAMINOPHEN PRN TAB: 5; 325 TABLET ORAL at 14:53

## 2021-03-14 RX ADMIN — SODIUM CHLORIDE SCH MLS/HR: 9 INJECTION, SOLUTION INTRAVENOUS at 01:43

## 2021-03-14 RX ADMIN — MUPIROCIN SCH GM: 2 CREAM TOPICAL at 12:02

## 2021-03-14 RX ADMIN — INSULIN LISPRO PRN UNITS: 100 INJECTION, SOLUTION INTRAVENOUS; SUBCUTANEOUS at 20:55

## 2021-03-14 NOTE — NUR
ENDORSED PT TO NIGHT SHIFT RN FOR THE CONTINUITY OF CARE. POC REVIEWED AND DISCUSSED.  PT IS 
STABLE . NO CHANGE OF CONDITION.

## 2021-03-14 NOTE — NUR
RECEIVED CALL FROM LAB. PT IS POSITIVE FOR STAPHYLOCOCCUS AUREUS - MRSA RIGHT FOOT WOUND 
FINAL RESULT. NOTIFIED ID MD., DR. CUMMINGS, AWATING TO CALL BACK FOR NEW ORDER.

## 2021-03-14 NOTE — NUR
RECEIVED BEDSIDE REPORT FROM DAY SHIFT NURSE FOR CONTINUITY OF CARE. PT IS AWAKE AND ALERT, 
A&OX4. ON RA WITH BREATHING UNLABORED. PT IS AMBULATORY WITH STANDBY ASSISTANCE. NON WEIGHT 
BEARING ON THE RIGHT FOOT. SKIN IS WARM AND DRY. S/P RIGHT FOOT PLANTAR DEBRIDEMENT. 
BILATERAL ULCERS ON FEET WITH DRESSING DRY AND INTACT. RIGHT UPPER ARM PICC LINE RUNNING NS 
AT 80 ML PER HOUR PER ORDER. PT IS STABLE AT THIS TIME. PLAN OF CARE DISCUSSED.

## 2021-03-14 NOTE — NUR
MADE ROUNDS ON PT. SHE IS SLEEPING. NO SIGNS OF DISTRESS. PT IS NOT EASILY AWOKEN BY NOISE. 
CHEST RISE AND FALL SYMMETRICAL. BREATHING IS UNLABORED ON RA. FLUIDS ARE INFUSING THROUGH 
PICC LINE. WILL CONTINUE TO MONITOR.

## 2021-03-14 NOTE — NUR
PT WAS GIVEN WATER AS REQUESTED. NO PAIN OR DISTRESS NOTED. PT DENIES PAIN AT THIS TIME. 
COMMODE AT BEDSIDE FOR VOIDING. BED IS IN THE LOWEST POSITION. WILL CONTINUE TO MONITOR.

## 2021-03-14 NOTE — NUR
PT STATES SHE HAS PAIN AT A SCALE OF 8/10. PT STATES THE PAIN AS ACHING. BP /78 PRIOR 
TO ADMINISTRATION OF MEDICATION. SHE WAS GIVEN MORPHINE IVP 4 MG AS ORDERED FOR PAIN. WILL 
MONITOR PAIN.

## 2021-03-14 NOTE — NUR
SPOKE ELLIS FROM THE LAB (MICROBIOLOGY) TO FOLLOW UP WITH THE WOUND CULTURE FINAL RESULTS, 
AS PER ELLIS, FINAL RESULTS MIGHT BE IN TOMORROW BUT I CAN STILL RELAY TO THE ID DOCTOR THE 
PRELIMINARY RESULTS. 

CONTACTED DR. CUMMINGS, RELAYED THE PRELIM RESULTS OF WOUND CULTURE. AS PER DR. CUMMINGS, HE CAN 
NOT DISCHARGE PT TODAY, HE WILL WAIT WHEN FINAL RESULTS ARE IN. RN ASSIGNED MADE AWARE.

## 2021-03-14 NOTE — NUR
PT IS AWAKE, WATCHING TV IN BED. PT WAS GIVEN A SANDWHICH AS REQUESTED. NO DISTRESS NOTED. 
FLUIDS ARE INFUSING AS ORDERED. BED IS IN THE LOWEST POSITION. BEDSIDE COMMODE NEXT TO BED. 
CALL LIGHT LIGHT WITHIN REACH.

## 2021-03-14 NOTE — NUR
ENDORSED PT TO DAY SHIFT NURSE FOR CONTINUITY OF CARE. PT IS STABLE AT THIS TIME. PLAN OF 
CARE DISCUSSED.

## 2021-03-14 NOTE — NUR
RECEIVED BEDSIDE REPORT FROM DAY NIGHT NURSE FOR CONTINUITY OF CARE. PT RESTING. EYES 
CLOSED. ON RA WITH BREATHING UNLABORED. NO S/S OF DISCOMFORT. PT ON BRP, NON WEIGHT BEARING 
ON THE RIGHT FOOT. SKIN IS WARM AND DRY. S/P RIGHT PLANTAR FOOT DEBRIDEMENT. RIGHT UPPER ARM 
PICC LINE INTACT RUNNING NS AT 80 ML/HR. FALL PRECAUTIONS IN PLACE. ON CONTACT PRECAUTIONS 
FOR + MRSA NA. PLAN OF CARE DISCUSSED.

## 2021-03-14 NOTE — NUR
PT IS ASLEEP IN SEMI FOWLERS POSITION. NO RESPIRATORY DISTRESS NOTED. NO PAIN NOTED. FLUIDS 
ARE INFUSING THROUGH THE PICC LINE. WATER WAS PROVIDED. FEET ARE ELEVATED OFF THE BED WITH 
PILLOWS. DRESSING IS INTACT ON BOTH FEET BILATERALLY. PT IS STABLE.

## 2021-03-15 VITALS — DIASTOLIC BLOOD PRESSURE: 88 MMHG | SYSTOLIC BLOOD PRESSURE: 133 MMHG

## 2021-03-15 VITALS — DIASTOLIC BLOOD PRESSURE: 88 MMHG | SYSTOLIC BLOOD PRESSURE: 149 MMHG

## 2021-03-15 VITALS — DIASTOLIC BLOOD PRESSURE: 72 MMHG | SYSTOLIC BLOOD PRESSURE: 158 MMHG

## 2021-03-15 VITALS — DIASTOLIC BLOOD PRESSURE: 93 MMHG | SYSTOLIC BLOOD PRESSURE: 164 MMHG

## 2021-03-15 RX ADMIN — SODIUM CHLORIDE SCH MLS/HR: 9 INJECTION, SOLUTION INTRAVENOUS at 02:18

## 2021-03-15 RX ADMIN — HYDROCODONE BITARTRATE AND ACETAMINOPHEN PRN TAB: 5; 325 TABLET ORAL at 22:42

## 2021-03-15 RX ADMIN — SODIUM CHLORIDE PRN MG: 9 INJECTION, SOLUTION INTRAVENOUS at 23:43

## 2021-03-15 RX ADMIN — INSULIN LISPRO PRN UNITS: 100 INJECTION, SOLUTION INTRAVENOUS; SUBCUTANEOUS at 20:40

## 2021-03-15 RX ADMIN — INSULIN LISPRO PRN UNITS: 100 INJECTION, SOLUTION INTRAVENOUS; SUBCUTANEOUS at 13:33

## 2021-03-15 RX ADMIN — Medication SCH DEV: at 20:36

## 2021-03-15 RX ADMIN — DOCUSATE SODIUM SCH MG: 100 CAPSULE, LIQUID FILLED ORAL at 09:57

## 2021-03-15 RX ADMIN — SODIUM CHLORIDE PRN MG: 9 INJECTION, SOLUTION INTRAVENOUS at 03:03

## 2021-03-15 RX ADMIN — Medication SCH DEV: at 05:48

## 2021-03-15 RX ADMIN — SODIUM CHLORIDE PRN MG: 9 INJECTION, SOLUTION INTRAVENOUS at 02:55

## 2021-03-15 RX ADMIN — Medication SCH DEV: at 17:17

## 2021-03-15 RX ADMIN — HYDROCODONE BITARTRATE AND ACETAMINOPHEN PRN TAB: 5; 325 TABLET ORAL at 17:17

## 2021-03-15 RX ADMIN — SODIUM CHLORIDE PRN MG: 9 INJECTION, SOLUTION INTRAVENOUS at 10:00

## 2021-03-15 RX ADMIN — SODIUM CHLORIDE SCH MLS/HR: 9 INJECTION, SOLUTION INTRAVENOUS at 17:17

## 2021-03-15 RX ADMIN — Medication SCH DEV: at 13:31

## 2021-03-15 RX ADMIN — INSULIN GLARGINE SCH UNITS: 100 INJECTION, SOLUTION SUBCUTANEOUS at 20:39

## 2021-03-15 RX ADMIN — INSULIN LISPRO PRN UNITS: 100 INJECTION, SOLUTION INTRAVENOUS; SUBCUTANEOUS at 05:50

## 2021-03-15 RX ADMIN — DEXTROSE SCH MLS/HR: 5 SOLUTION INTRAVENOUS at 09:57

## 2021-03-15 NOTE — NUR
Patient awake, alert and able to verbalize needs. Breathing is easy on room air, denies 
pain. Patient has dressings to bilateral feet and no drainage noted. Safety co measures in 
place with call light, bed lowered and has no further needs.

## 2021-03-15 NOTE — NUR
PT STATED SHE WAS FEELING NAUSEOUS. PT WAS GIVEN ZOFRAN FOR NAUSEA IVP AS ORDERED. WILL 
MONITOR FOR NAUSEA. NO EPISODE OF VOMITING ON THIS SHIFT.

## 2021-03-15 NOTE — NUR
PT STATED SHE HAD PAIN AT A SCALE OF 10/10. PAIN IS ACHING IN CHARACTERISTIC. PT STATES THE 
PAIN IS VERY SEVERE AND SHE IS VISIBLY SHAKING. SHE WAS GIVEN MORPHINE IVP FOR PAIN. BP WAS 
169/98 PRIOR TO ADMINISTRATION OF MEDICATION. WILL CONTINUE TO MONITOR.

## 2021-03-15 NOTE — NUR
PT IS SLEEPING. NO N/V, SOB, OR PAIN NOTED. PT IS RESTING COMFORTABLY IN SEMI FOWLERS 
POSITION. BELONGINGS WITHIN REACH AT BEDSIDE. IV FLUIDS ARE INFUSING THROUGH PICC LINE. PT 
IS STABLE AT THIS TIME. WILL CONTINUE TO MONITOR.

## 2021-03-15 NOTE — NUR
PT IS AWAKE AND ALERT, LAYING IN BED WATCHING TV. NO DISTRESS NOTED. PT STATES SHE HAS A 
TOLERABLE AMOUNT OF PAIN IN HER RIGHT FOOT. PT WAS GIVEN PUDDING AND ICE AS REQUESTED. NEEDS 
HAVE BEEN MET.

## 2021-03-15 NOTE — NUR
ENDORSED PT TO DAY SHIFT NURSE FOR CONTINUITY OF CARE. PT IS AWAKE AND ALERT AT THIS TIME. 
PT IS STABLE. PLAN OF CARE DISCUSSED.

## 2021-03-15 NOTE — NUR
PT VERBALIZED 8-9/10 FOOT PAIN AND THAT NORCO DID NOT HELP HER. PRN MORPHINE GIVEN AS 
ORDERED. WILL CONTINUE TO MONITOR.

## 2021-03-15 NOTE — NUR
ROUNDS MADE. PT IN BED, WATCHING ON HER PHONE. NO S/SX OF DISTRESS NOTED. PT DENIES ANY PAIN 
OR DISCOMFORT AS WELL. WILL CONTINUE TO MONITOR.

## 2021-03-15 NOTE — NUR
PT COMPLAINING OF FOOT PAIN 6-7/10. PRN PAIN MEDICATION GIVEN AS ORDERED. WILL CONTINUE TO 
MONITOR.

## 2021-03-15 NOTE — NUR
PT IS SLEEPING. CHEST RISE AND FALL IS SYMMETRICAL. NO DISTRESS OR PAIN NOTED. IV FLUIDS ARE 
PATENT AND INFUSING. DRESSING IS INTACT AND IN PLACE ON BILATERAL FEET. CALL LIGHT IS WITHIN 
REACH. PT IS STABLE.

## 2021-03-15 NOTE — NUR
VS STABLE. BLOOD SUGAR 175, SCHEDULED INSULIN AND COVERAGE GIVEN. SCHEDULED MEDS GIVEN AS 
WELL. PT NOT IN DISTRESS. NO REQUESTS MADE AT THIS TIME. CALL LIGHT WITHIN REACH. WILL 
CONTINUE TO MONITOR.

## 2021-03-15 NOTE — NUR
PT IS AWAKE AND UP TO RESTROOM. STANDBY ASSISTANCE PROVIDED. PT IS NON WEIGHT BEARING ON THE 
RIGHT FOOT. PT IS STABLE AT THIS TIME. DENIES ANY PAIN OR DISCOMFORT.

## 2021-03-15 NOTE — NUR
RECEIVED REPORT FROM DAY SHIFT NURSE. PT IN BED RESTING, AWAKE, AAOX4, ABLE TO MAKE NEEDS 
KNOWN. RESPIRATIONS EVEN AND UNLABORED TO ROOM AIR. PT NOT IN DISTRESS. ABDOMEN IS SOFT AND 
NON-TENDER, ACTIVE BOWEL SOUNDS NOTED. SKIN IS WARM AND DRY. PT WITH WOUND ON RIGHT FOOT S/P 
DEBRIDEMENT. DRESSING INTACT. PT WITH LEFT UPPER ARM PICC LINE IN PLACE, PATENT AND INTACT. 
IVF INFUSING WELL. PT DENIES ANY PAIN OR DISCOMFORT AT THIS TIME, NO REQUESTS MADE. POC 
DISCUSSED, PT VERBALIZED UNDERSTANDING. SAFETY MEASURES IN PLACE, CALL LIGHT WITHIN REACH. 
WILL CONTINUE TO MONITOR.

-------------------------------------------------------------------------------

Addendum: 03/15/21 at 2103 by Dennys Cerda RN

-------------------------------------------------------------------------------

PICC LINE IS ON THE RIGHT UPPER ARM. PT ALSO WITH BILATERAL DIABETIC FOOT.

## 2021-03-16 VITALS — SYSTOLIC BLOOD PRESSURE: 153 MMHG | DIASTOLIC BLOOD PRESSURE: 92 MMHG

## 2021-03-16 VITALS — SYSTOLIC BLOOD PRESSURE: 142 MMHG | DIASTOLIC BLOOD PRESSURE: 84 MMHG

## 2021-03-16 VITALS — SYSTOLIC BLOOD PRESSURE: 154 MMHG | DIASTOLIC BLOOD PRESSURE: 88 MMHG

## 2021-03-16 VITALS — DIASTOLIC BLOOD PRESSURE: 90 MMHG | SYSTOLIC BLOOD PRESSURE: 155 MMHG

## 2021-03-16 LAB
ANION GAP SERPL CALCULATED.3IONS-SCNC: 12.5 MMOL/L (ref 8–16)
BUN SERPL-MCNC: 60 MG/DL (ref 7–18)
CHLORIDE SERPL-SCNC: 110 MMOL/L (ref 98–107)
CO2 SERPL-SCNC: 20.4 MMOL/L (ref 21–32)
CREAT SERPL-MCNC: 2.3 MG/DL (ref 0.6–1.3)
GFR SERPL CREATININE-BSD FRML MDRD: 31 ML/MIN (ref 90–?)
GLUCOSE SERPL-MCNC: 214 MG/DL (ref 74–106)
POTASSIUM SERPL-SCNC: 4.9 MMOL/L (ref 3.5–5.1)
SODIUM SERPL-SCNC: 138 MMOL/L (ref 136–145)

## 2021-03-16 RX ADMIN — INSULIN LISPRO PRN UNITS: 100 INJECTION, SOLUTION INTRAVENOUS; SUBCUTANEOUS at 21:18

## 2021-03-16 RX ADMIN — Medication SCH DEV: at 16:25

## 2021-03-16 RX ADMIN — SODIUM CHLORIDE PRN MG: 9 INJECTION, SOLUTION INTRAVENOUS at 05:29

## 2021-03-16 RX ADMIN — DOCUSATE SODIUM SCH MG: 100 CAPSULE, LIQUID FILLED ORAL at 08:32

## 2021-03-16 RX ADMIN — SODIUM CHLORIDE PRN MG: 9 INJECTION, SOLUTION INTRAVENOUS at 20:29

## 2021-03-16 RX ADMIN — SODIUM CHLORIDE PRN MG: 9 INJECTION, SOLUTION INTRAVENOUS at 10:03

## 2021-03-16 RX ADMIN — INSULIN LISPRO PRN UNITS: 100 INJECTION, SOLUTION INTRAVENOUS; SUBCUTANEOUS at 12:02

## 2021-03-16 RX ADMIN — Medication SCH DEV: at 21:26

## 2021-03-16 RX ADMIN — INSULIN LISPRO PRN UNITS: 100 INJECTION, SOLUTION INTRAVENOUS; SUBCUTANEOUS at 06:33

## 2021-03-16 RX ADMIN — Medication SCH DEV: at 12:02

## 2021-03-16 RX ADMIN — SODIUM CHLORIDE SCH MLS/HR: 9 INJECTION, SOLUTION INTRAVENOUS at 06:43

## 2021-03-16 RX ADMIN — INSULIN GLARGINE SCH UNITS: 100 INJECTION, SOLUTION SUBCUTANEOUS at 21:20

## 2021-03-16 RX ADMIN — DEXTROSE SCH MLS/HR: 5 SOLUTION INTRAVENOUS at 08:36

## 2021-03-16 RX ADMIN — SODIUM CHLORIDE PRN MG: 9 INJECTION, SOLUTION INTRAVENOUS at 13:35

## 2021-03-16 RX ADMIN — Medication SCH DEV: at 06:31

## 2021-03-16 RX ADMIN — SODIUM CHLORIDE SCH MLS/HR: 9 INJECTION, SOLUTION INTRAVENOUS at 03:10

## 2021-03-16 NOTE — NUR
PATIENT C/O SHARP PAIN R LOWER FOOT 9/10. MEDICATED WITH MORPHINE 4 MG IVP AS ORDERED.  
MONITOR PAIN LEVEL AFTER 30 MINUTES, REPOSITION PATIENT FOR COMFORT.

## 2021-03-16 NOTE — NUR
PATIENT IN BED AWAKE AND ALERT EATING BREAKFAST. MORNING ROUTINE MEDICATIONS GIVEN. PATIENT 
TOLERATING WELL. LOWER BILATERAL FEET WRAPPED IN [place] FROM MORNING DRESSING CHANGE WITH 
PODIATRIST. FLIP PICC INFUSING WELL. PATIENT ABLE TO AMBULATE TO THE BATHROOM. ENCOURAGED TO 
USE CALL LIGHT FOR ASSIST AS NEEDED. PATIENT VERBALIZED UNDERSTANDING. CALL LIGHT WITHIN 
REACH. WILL CONTINUE TO [facility]

## 2021-03-16 NOTE — NUR
MORPHINE GIVEN FOR PAIN TO RIGHT FOOT. PATIENT IN BED AWAKE AND ALERT. RESP EVEN AND 
UNLABORED ON ROOM AIR. CALL LIGHT WITHIN REACH. WILL CONTINUE TO MONITOR.

## 2021-03-16 NOTE — NUR
PATIENT RECEIVED IN HER ROOM USING RESTROOM. RN'S GAVE PRIVACY TO THE PATIENT SEEMS ALERT 
AND ORIENTED X 3, COHERENT, ABLE TO SPEAK HER NEEDS, DENIES PAIN, CONTINUE TO MONITOR.

## 2021-03-16 NOTE — NUR
PT COMPLAINING OF NAUSEA, PRN ZOFRAN GIVEN. WILL CONTINUE TO MONITOR. Topical Sulfur Applications Pregnancy And Lactation Text: This medication is Pregnancy Category C and has an unknown safety profile during pregnancy. It is unknown if this topical medication is excreted in breast milk.

## 2021-03-16 NOTE — NUR
BLOOD SUGAR 184. INSULIN COVERAGE GIVEN AS ORDERED. PT DENIES ANY PAIN OR DISCOMFORT. WILL 
CONTINUE TO MONITOR.

## 2021-03-16 NOTE — NUR
ASLEEP. VISIBLE CHEST RISE AND FALL NOTED. NO S/SX OF DISCOMFORT NOTED. PT KEPT SAFE AND 
COMFORTABLE. WILL CONTINUE TO MONITOR.

## 2021-03-16 NOTE — NUR
PAIN STATED RELIEF. PATIENT IN BED RESTING. BLOOD SUGAR 285, INSULIN COVERAGE PER SLIDING 
SCALE. NO NOTED ACUTE S/S DISTRESS AT THIS TIME. CALL LIGHT WITHIN REACH. WILL CONTINUE TO 
MONITOR.

## 2021-03-16 NOTE — NUR
RECEIVED PATIENT FROM NIGHT NURSE. PATIENT IN BED AWAKE AND ALERT. RESP EVEN AND UNLABORED 
ON ROOM AIR. DENIED OF PAIN AT THIS TIME. FLIP PICC INFUSING NS 80ML/HR. BILATERAL LOWER FEET 
WRAPPED NOTED. S/P RIGHT PLANTAR DEBRIDEMENT. PLAN OF CARE DISCUSSED, PATIENT VERBALIZED 
UNDERSTANDING. HOB ELEVATED. SAFETY MEASURES IN PLACE. CONTACT ISO OBSERVED FOR MRSA. CALL 
LIGHT WITHIN REACH. WILL CONTINUE TO MONITOR.

## 2021-03-16 NOTE — NUR
PATIENT ENDORSED TO NIGHT NURSE. PATIENT AMBULATED TO THE BATHROOM. PATIENT ENCOURAGED TO 
NWB TO RIGHT FOOT DURING AMBULATING. PATIENT VERBALIZED UNDERSTANDING. PATIENT IN STABLE 
CONDITION.

## 2021-03-16 NOTE — NUR
VS STABLE. PT RESTING IN BED. PT DENIES ANY PAIN OR DISCOMFORT AT THIS TIME. NO REQUESTS 
MADE. PT KEPT SAFE AND COMFORTABLE. WILL CONTINUE TO MONITOR.

## 2021-03-16 NOTE — NUR
PATIENT IN BED SLEEPING, EASILY AROUSED TO AWAKE AND ALERT. DENIED OF PAIN OR FEELING 
NAUSEOUS. BLOOD SUGAR 92, NO INSULIN COVERAGE PER SLIDING SCALE. CALL LIGHT WITHIN REACH. 
WILL CONTINUE TO MONITOR.

## 2021-03-16 NOTE — NUR
ZOFRAN GIVEN FOR C/O FEELING NAUSEOUS. NO REPORT OF VOMITING. PATIENT IN BED RESTING. PAIN 
IS TOLERABLE AT THIS TIME. CALL LIGHT WITHIN REACH. WILL CONTINUE TO MONITOR.

## 2021-03-17 ENCOUNTER — HOSPITAL ENCOUNTER (EMERGENCY)
Dept: HOSPITAL 26 - MED | Age: 36
LOS: 1 days | Discharge: HOME | End: 2021-03-18
Payer: COMMERCIAL

## 2021-03-17 VITALS — DIASTOLIC BLOOD PRESSURE: 84 MMHG | SYSTOLIC BLOOD PRESSURE: 159 MMHG

## 2021-03-17 VITALS — SYSTOLIC BLOOD PRESSURE: 173 MMHG | DIASTOLIC BLOOD PRESSURE: 99 MMHG

## 2021-03-17 VITALS — BODY MASS INDEX: 23.7 KG/M2 | HEIGHT: 69 IN | WEIGHT: 160 LBS

## 2021-03-17 DIAGNOSIS — Z79.4: ICD-10-CM

## 2021-03-17 DIAGNOSIS — Z79.899: ICD-10-CM

## 2021-03-17 DIAGNOSIS — Z79.2: ICD-10-CM

## 2021-03-17 DIAGNOSIS — M86.9: Primary | ICD-10-CM

## 2021-03-17 DIAGNOSIS — E11.9: ICD-10-CM

## 2021-03-17 DIAGNOSIS — I11.0: ICD-10-CM

## 2021-03-17 DIAGNOSIS — Z88.0: ICD-10-CM

## 2021-03-17 DIAGNOSIS — Z98.890: ICD-10-CM

## 2021-03-17 DIAGNOSIS — Z88.8: ICD-10-CM

## 2021-03-17 DIAGNOSIS — I50.9: ICD-10-CM

## 2021-03-17 DIAGNOSIS — Z88.1: ICD-10-CM

## 2021-03-17 LAB
ANION GAP SERPL CALCULATED.3IONS-SCNC: 9.5 MMOL/L (ref 8–16)
BUN SERPL-MCNC: 62 MG/DL (ref 7–18)
CHLORIDE SERPL-SCNC: 110 MMOL/L (ref 98–107)
CO2 SERPL-SCNC: 21.4 MMOL/L (ref 21–32)
CREAT SERPL-MCNC: 2 MG/DL (ref 0.6–1.3)
GFR SERPL CREATININE-BSD FRML MDRD: 36 ML/MIN (ref 90–?)
GLUCOSE SERPL-MCNC: 157 MG/DL (ref 74–106)
POTASSIUM SERPL-SCNC: 4.9 MMOL/L (ref 3.5–5.1)
SODIUM SERPL-SCNC: 136 MMOL/L (ref 136–145)

## 2021-03-17 PROCEDURE — 96365 THER/PROPH/DIAG IV INF INIT: CPT

## 2021-03-17 PROCEDURE — 99284 EMERGENCY DEPT VISIT MOD MDM: CPT

## 2021-03-17 RX ADMIN — SODIUM CHLORIDE PRN MG: 9 INJECTION, SOLUTION INTRAVENOUS at 04:26

## 2021-03-17 RX ADMIN — SODIUM CHLORIDE SCH MLS/HR: 9 INJECTION, SOLUTION INTRAVENOUS at 04:04

## 2021-03-17 RX ADMIN — Medication SCH DEV: at 07:05

## 2021-03-17 RX ADMIN — DOCUSATE SODIUM SCH MG: 100 CAPSULE, LIQUID FILLED ORAL at 09:28

## 2021-03-17 NOTE — NUR
RECEIVED IN BED 4 VIA W/C WITH C/O RIGHT FOOT PAIN AND "I NEED IV ANTIBIOTICS". 
PT WAS DISCHARGED FROM HERE TODAY, ADMITTED TO [facility]. WAS 
ADMITTED FOR 3 HOURS AND LEFT AMA. PT STATES, "THEY WERE TAKING TOO LONG AND 
THE FOOD WAS BAD". RIGHT FOOT IN CAST BOOT, PT UNABLE TO BARE WEIGHT



PMH : DM, CHF, HTN, RIGHT GT TOE AMPUTATION

ALLERGY : PCN

## 2021-03-17 NOTE — NUR
RECEIVED REPORT FROM NIGHT SHIFT RN FOR CONTINUITY OF CARE. PATIENT RESTING IN BED, DENIES 
PAIN AT THIS TIME. MORPHINE WAS GIVEN AN HOUR AGO, PER NIGHT SHIFT RN. RESPIRATORY EVEN 
UNLABORED ON RA. BILATERAL FOOT COVERED WITH DRESSING. AMBULATORY. NWB ON RIGHT FOOT. NO 
ACUTE DISTRESS NOTED AT THIS TIME. SAFETY MEASURES IN PLACE, WILL CONTINUE TO MONITOR.

## 2021-03-17 NOTE — NUR
DISCHARGE PAPER SIGNED, DISCHARGE INSTRUCTIONS PROVIDED/FOLLOW UP. DISCHARGE PROTOCOL 
FOLLOWED. PT IN STABLE CONDITION.

## 2021-03-17 NOTE — NUR
PATIENT WAS PICKED UP BY THE TRANSPORT AND TRANSFER TO [facility]. PATIENT IN STABLE 
CONDITION.

## 2021-03-17 NOTE — NUR
NEW ORDER OF 0.1MG CLONIDINE GIVEN FOR /99. EDUCATION PROVIDED. SAFETY MEASURES IN 
PLACE, WILL CONTINUE TO MONITOR.

## 2021-03-18 VITALS — SYSTOLIC BLOOD PRESSURE: 148 MMHG | DIASTOLIC BLOOD PRESSURE: 76 MMHG

## 2021-03-18 NOTE — NUR
Patient discharged with v/s stable. Written and verbal after care instructions 
given and explained. 

Patient verbalized understanding. Wheel Chair Assisted with to home. All 
questions addressed prior to discharge. Advised to follow up with PMD. PT 
STATES SHE WILL FOLLOW UP WITH HOME HEALTH

## 2024-08-03 NOTE — NUR
PT SLEEPING AT THIS TIME,NO S/S RESP DISTRESS,NO SOB. BLOOD SUGAR  CONTINUE ON 3 
UNITS/HR OF INSULIN, English

## 2025-02-12 NOTE — NUR
PATIENT STABLE. NO COMPLAIN AT THIS TIME. NO ACUTE EVENT THROUGHOUT THE NIGHT. CALL LIGHT 
WITHIN REACH. WILL ENDORSE THE PT TO THE ONCOMING RN FOR CONTINUITY OF CARE. right flank pain